# Patient Record
Sex: FEMALE | Race: WHITE | NOT HISPANIC OR LATINO | Employment: OTHER | URBAN - METROPOLITAN AREA
[De-identification: names, ages, dates, MRNs, and addresses within clinical notes are randomized per-mention and may not be internally consistent; named-entity substitution may affect disease eponyms.]

---

## 2023-03-07 ENCOUNTER — NURSING HOME VISIT (OUTPATIENT)
Dept: POST ACUTE CARE | Facility: EXTERNAL LOCATION | Age: 78
End: 2023-03-07
Payer: MEDICARE

## 2023-03-07 DIAGNOSIS — F03.B0 MODERATE DEMENTIA, UNSPECIFIED DEMENTIA TYPE, UNSPECIFIED WHETHER BEHAVIORAL, PSYCHOTIC, OR MOOD DISTURBANCE OR ANXIETY (MULTI): ICD-10-CM

## 2023-03-07 DIAGNOSIS — E78.5 HYPERLIPIDEMIA, UNSPECIFIED HYPERLIPIDEMIA TYPE: ICD-10-CM

## 2023-03-07 DIAGNOSIS — M19.90 OSTEOARTHRITIS, UNSPECIFIED OSTEOARTHRITIS TYPE, UNSPECIFIED SITE: ICD-10-CM

## 2023-03-07 PROCEDURE — 99348 HOME/RES VST EST LOW MDM 30: CPT | Performed by: INTERNAL MEDICINE

## 2023-03-07 NOTE — LETTER
Subjective  Chief complaint: Gabby Espino is a 77 y.o. female who is a assisted living/ home patient  Presents for general medical care and follow-up.  HPI:  Patient presents for general medical care and f/u.  Patient seen and examined at bedside.  No issues per nursing.  Patient has no acute complaints. Denies chest pain and headache.  Patient has a diagnosis of hyperlipidemia.   denies any muscle aches.  Patient is a diagnosis of osteoarthritis.  Denies any pain or discomfort.  Patient has dementia and requires assist with ADLs.  No acute distress.        Review of Systems  All systems reviewed and negative except for what was mentioned in the HPI    Vital signs: 104/80, 98.2, 72, 18    Objective  Physical Exam  Constitutional:       General: She is not in acute distress.  Eyes:      Extraocular Movements: Extraocular movements intact.   Cardiovascular:      Rate and Rhythm: Regular rhythm.   Pulmonary:      Effort: Pulmonary effort is normal.      Breath sounds: Normal breath sounds.   Abdominal:      General: Bowel sounds are normal.      Palpations: Abdomen is soft.   Musculoskeletal:      Cervical back: Neck supple.      Right lower leg: No edema.      Left lower leg: No edema.   Neurological:      Mental Status: She is alert.   Psychiatric:         Mood and Affect: Mood normal.         Behavior: Behavior is cooperative.         Assessment/Plan  Problem List Items Addressed This Visit       Dementia (CMS/HCC)     Mentation at baseline.  Continue Aricept.  Monitor for changes in behavior.         HLD (hyperlipidemia)     Continue atorvastatin.         OA (osteoarthritis)     Pain is controlled.  Monitor for changes.          Medications, treatments, and labs reviewed  Continue medications and treatments as listed in [facility]    Scribe Attestation  By signing my name below, ralf MENARD Scribe   attest that this documentation has been prepared under the direction and in the presence of Krissy Tillman  MD.    Provider Attestation - Scribe documentation  All medical record entries made by the Scribe were at my direction and personally dictated by me. I have reviewed the chart and agree that the record accurately reflects my personal performance of the history, physical exam, discussion and plan.

## 2023-03-08 PROBLEM — C50.919 BREAST CA (MULTI): Status: ACTIVE | Noted: 2023-03-08

## 2023-03-08 PROBLEM — E78.5 HLD (HYPERLIPIDEMIA): Status: ACTIVE | Noted: 2023-03-08

## 2023-03-08 PROBLEM — M19.90 OA (OSTEOARTHRITIS): Status: ACTIVE | Noted: 2023-03-08

## 2023-03-08 PROBLEM — F03.90 DEMENTIA (MULTI): Status: ACTIVE | Noted: 2023-03-08

## 2023-04-04 ENCOUNTER — NURSING HOME VISIT (OUTPATIENT)
Dept: POST ACUTE CARE | Facility: EXTERNAL LOCATION | Age: 78
End: 2023-04-04
Payer: MEDICARE

## 2023-04-04 DIAGNOSIS — F03.B0 MODERATE DEMENTIA, UNSPECIFIED DEMENTIA TYPE, UNSPECIFIED WHETHER BEHAVIORAL, PSYCHOTIC, OR MOOD DISTURBANCE OR ANXIETY (MULTI): ICD-10-CM

## 2023-04-04 DIAGNOSIS — E78.5 HYPERLIPIDEMIA, UNSPECIFIED HYPERLIPIDEMIA TYPE: ICD-10-CM

## 2023-04-04 DIAGNOSIS — M19.90 OSTEOARTHRITIS, UNSPECIFIED OSTEOARTHRITIS TYPE, UNSPECIFIED SITE: ICD-10-CM

## 2023-04-04 PROCEDURE — 99348 HOME/RES VST EST LOW MDM 30: CPT | Performed by: INTERNAL MEDICINE

## 2023-04-04 NOTE — LETTER
Patient: Gabby Espino  : 1945    Encounter Date: 2023    PROGRESS NOTE    Subjective  Chief complaint: Gabby Espino is a 77 y.o. female who is an assisted living patient being seen and evaluated for monthly general medical care and follow-up.    HPI:  Patient presents for general medical care and f/u.  Patient seen and examined at bedside.  No issues per nursing.  Patient has no acute complaints. Denies chest pain and headache.  Patient has a diagnosis of hyperlipidemia.   denies any muscle aches.  Patient is a diagnosis of osteoarthritis.  Denies any pain or discomfort.  Patient has dementia and requires assist with ADLs.  No acute distress.      Objective  Vital signs:  120/76, 76, 18, 96%    Physical Exam  Constitutional:       General: She is not in acute distress.  Eyes:      Extraocular Movements: Extraocular movements intact.   Cardiovascular:      Rate and Rhythm: Regular rhythm.   Pulmonary:      Effort: Pulmonary effort is normal.      Breath sounds: Normal breath sounds.   Abdominal:      General: Bowel sounds are normal.      Palpations: Abdomen is soft.   Musculoskeletal:      Cervical back: Neck supple.      Right lower leg: No edema.      Left lower leg: No edema.   Neurological:      Mental Status: She is alert.   Psychiatric:         Mood and Affect: Mood normal.         Behavior: Behavior is cooperative.         Assessment/Plan  Problem List Items Addressed This Visit          Nervous    Dementia (CMS/HCC)     Mentation at baseline.    Continue Aricept.    Monitor for changes in behavior.            Musculoskeletal    OA (osteoarthritis)     Pain is controlled.    Monitor for changes.            Other    HLD (hyperlipidemia)     Continue atorvastatin.          Medications, treatments, and labs reviewed  Continue medications and treatments as listed in [facility]    Scribe Attestation  By signing my name below, I, Bijal Arellano, Scribe   attest that this documentation has been prepared  under the direction and in the presence of Krissy Tillman MD.    Provider Attestation - Scribe documentation  All medical record entries made by the Scribe were at my direction and personally dictated by me. I have reviewed the chart and agree that the record accurately reflects my personal performance of the history, physical exam, discussion and plan.      Electronically Signed By: Krissy Tillman MD   4/6/23  4:54 PM

## 2023-04-06 NOTE — PROGRESS NOTES
PROGRESS NOTE    Subjective   Chief complaint: Gabby Espino is a 77 y.o. female who is an assisted living patient being seen and evaluated for monthly general medical care and follow-up.    HPI:  Patient presents for general medical care and f/u.  Patient seen and examined at bedside.  No issues per nursing.  Patient has no acute complaints. Denies chest pain and headache.  Patient has a diagnosis of hyperlipidemia.   denies any muscle aches.  Patient is a diagnosis of osteoarthritis.  Denies any pain or discomfort.  Patient has dementia and requires assist with ADLs.  No acute distress.      Objective   Vital signs:  120/76, 76, 18, 96%    Physical Exam  Constitutional:       General: She is not in acute distress.  Eyes:      Extraocular Movements: Extraocular movements intact.   Cardiovascular:      Rate and Rhythm: Regular rhythm.   Pulmonary:      Effort: Pulmonary effort is normal.      Breath sounds: Normal breath sounds.   Abdominal:      General: Bowel sounds are normal.      Palpations: Abdomen is soft.   Musculoskeletal:      Cervical back: Neck supple.      Right lower leg: No edema.      Left lower leg: No edema.   Neurological:      Mental Status: She is alert.   Psychiatric:         Mood and Affect: Mood normal.         Behavior: Behavior is cooperative.         Assessment/Plan   Problem List Items Addressed This Visit          Nervous    Dementia (CMS/HCC)     Mentation at baseline.    Continue Aricept.    Monitor for changes in behavior.            Musculoskeletal    OA (osteoarthritis)     Pain is controlled.    Monitor for changes.            Other    HLD (hyperlipidemia)     Continue atorvastatin.          Medications, treatments, and labs reviewed  Continue medications and treatments as listed in [facility]    Scribe Attestation  By signing my name below, Bijal MENARD, Scribe   attest that this documentation has been prepared under the direction and in the presence of Krissy Tillman  MD.    Provider Attestation - Scribe documentation  All medical record entries made by the Scribe were at my direction and personally dictated by me. I have reviewed the chart and agree that the record accurately reflects my personal performance of the history, physical exam, discussion and plan.

## 2023-05-02 ENCOUNTER — NURSING HOME VISIT (OUTPATIENT)
Dept: POST ACUTE CARE | Facility: EXTERNAL LOCATION | Age: 78
End: 2023-05-02
Payer: MEDICARE

## 2023-05-02 DIAGNOSIS — M19.90 OSTEOARTHRITIS, UNSPECIFIED OSTEOARTHRITIS TYPE, UNSPECIFIED SITE: ICD-10-CM

## 2023-05-02 DIAGNOSIS — E78.5 HYPERLIPIDEMIA, UNSPECIFIED HYPERLIPIDEMIA TYPE: ICD-10-CM

## 2023-05-02 DIAGNOSIS — F03.B0 MODERATE DEMENTIA, UNSPECIFIED DEMENTIA TYPE, UNSPECIFIED WHETHER BEHAVIORAL, PSYCHOTIC, OR MOOD DISTURBANCE OR ANXIETY (MULTI): ICD-10-CM

## 2023-05-02 PROCEDURE — 99348 HOME/RES VST EST LOW MDM 30: CPT | Performed by: INTERNAL MEDICINE

## 2023-05-02 NOTE — LETTER
Patient: Gabby Espino  : 1945    Encounter Date: 2023    PROGRESS NOTE    Subjective  Chief complaint: Gabby Espino is a 77 y.o. female who is an assisted living patient being seen and evaluated for monthly general medical care and follow-up.    HPI:  Patient presents for general medical care and f/u.  Patient seen and examined at bedside.  No issues per nursing.  Patient has no acute complaints. Denies chest pain and headache.  Patient has a diagnosis of hyperlipidemia.   denies any muscle aches.  Patient is a diagnosis of osteoarthritis.  Denies any pain or discomfort.  Patient has dementia and requires assist with ADLs.  No acute distress.      Objective  Vital signs:  123/76, 96%    Physical Exam  Constitutional:       General: She is not in acute distress.  Eyes:      Extraocular Movements: Extraocular movements intact.   Cardiovascular:      Rate and Rhythm: Regular rhythm.   Pulmonary:      Effort: Pulmonary effort is normal.      Breath sounds: Normal breath sounds.   Abdominal:      General: Bowel sounds are normal.      Palpations: Abdomen is soft.   Musculoskeletal:      Cervical back: Neck supple.      Right lower leg: No edema.      Left lower leg: No edema.   Neurological:      Mental Status: She is alert.   Psychiatric:         Mood and Affect: Mood normal.         Behavior: Behavior is cooperative.         Assessment/Plan  Problem List Items Addressed This Visit          Nervous    Dementia (CMS/HCC)     Mentation at baseline.    Continue Aricept.    Monitor for changes in behavior.            Musculoskeletal    OA (osteoarthritis)     Pain is controlled.    Monitor for changes.            Other    HLD (hyperlipidemia)     Continue atorvastatin.        Medications, treatments, and labs reviewed  Continue medications and treatments as listed in [facility]    Scribe Attestation  By signing my name below, IBijal, Carolinaibdarlyn   attest that this documentation has been prepared under the  direction and in the presence of Krissy Tillman MD.    Provider Attestation - Scribe documentation  All medical record entries made by the Scribe were at my direction and personally dictated by me. I have reviewed the chart and agree that the record accurately reflects my personal performance of the history, physical exam, discussion and plan.  1. Moderate dementia, unspecified dementia type, unspecified whether behavioral, psychotic, or mood disturbance or anxiety (CMS/[facility])        2. Hyperlipidemia, unspecified hyperlipidemia type        3. Osteoarthritis, unspecified osteoarthritis type, unspecified site                Electronically Signed By: Krissy Tillman MD   5/3/23  4:53 PM

## 2023-05-03 NOTE — PROGRESS NOTES
PROGRESS NOTE    Subjective   Chief complaint: Gabby Espino is a 77 y.o. female who is an assisted living patient being seen and evaluated for monthly general medical care and follow-up.    HPI:  Patient presents for general medical care and f/u.  Patient seen and examined at bedside.  No issues per nursing.  Patient has no acute complaints. Denies chest pain and headache.  Patient has a diagnosis of hyperlipidemia.   denies any muscle aches.  Patient is a diagnosis of osteoarthritis.  Denies any pain or discomfort.  Patient has dementia and requires assist with ADLs.  No acute distress.      Objective   Vital signs:  123/76, 96%    Physical Exam  Constitutional:       General: She is not in acute distress.  Eyes:      Extraocular Movements: Extraocular movements intact.   Cardiovascular:      Rate and Rhythm: Regular rhythm.   Pulmonary:      Effort: Pulmonary effort is normal.      Breath sounds: Normal breath sounds.   Abdominal:      General: Bowel sounds are normal.      Palpations: Abdomen is soft.   Musculoskeletal:      Cervical back: Neck supple.      Right lower leg: No edema.      Left lower leg: No edema.   Neurological:      Mental Status: She is alert.   Psychiatric:         Mood and Affect: Mood normal.         Behavior: Behavior is cooperative.         Assessment/Plan   Problem List Items Addressed This Visit          Nervous    Dementia (CMS/HCC)     Mentation at baseline.    Continue Aricept.    Monitor for changes in behavior.            Musculoskeletal    OA (osteoarthritis)     Pain is controlled.    Monitor for changes.            Other    HLD (hyperlipidemia)     Continue atorvastatin.        Medications, treatments, and labs reviewed  Continue medications and treatments as listed in [facility]    Scribe Attestation  By signing my name below, Bijal MENARD, Scribe   attest that this documentation has been prepared under the direction and in the presence of Krissy Tillman MD.    Provider  Attestation - Scribe documentation  All medical record entries made by the Scribe were at my direction and personally dictated by me. I have reviewed the chart and agree that the record accurately reflects my personal performance of the history, physical exam, discussion and plan.  1. Moderate dementia, unspecified dementia type, unspecified whether behavioral, psychotic, or mood disturbance or anxiety (CMS/HCC)        2. Hyperlipidemia, unspecified hyperlipidemia type        3. Osteoarthritis, unspecified osteoarthritis type, unspecified site

## 2023-06-06 ENCOUNTER — NURSING HOME VISIT (OUTPATIENT)
Dept: POST ACUTE CARE | Facility: EXTERNAL LOCATION | Age: 78
End: 2023-06-06
Payer: MEDICARE

## 2023-06-06 DIAGNOSIS — E78.5 HYPERLIPIDEMIA, UNSPECIFIED HYPERLIPIDEMIA TYPE: ICD-10-CM

## 2023-06-06 DIAGNOSIS — M19.90 OSTEOARTHRITIS, UNSPECIFIED OSTEOARTHRITIS TYPE, UNSPECIFIED SITE: ICD-10-CM

## 2023-06-06 DIAGNOSIS — F03.B0 MODERATE DEMENTIA, UNSPECIFIED DEMENTIA TYPE, UNSPECIFIED WHETHER BEHAVIORAL, PSYCHOTIC, OR MOOD DISTURBANCE OR ANXIETY (MULTI): ICD-10-CM

## 2023-06-06 PROCEDURE — 99348 HOME/RES VST EST LOW MDM 30: CPT | Performed by: INTERNAL MEDICINE

## 2023-06-06 NOTE — LETTER
Patient: Gabby Espino  : 1945    Encounter Date: 2023    PROGRESS NOTE    Subjective  Chief complaint: Gabby Espnio is a 77 y.o. female who is an assisted living patient being seen and evaluated for monthly general medical care and follow-up.    HPI:  Patient presents for general medical care and f/u.  Patient seen and examined at bedside.  No issues per nursing.  Patient has no acute complaints. Denies chest pain and headache.  Patient has a diagnosis of hyperlipidemia.   denies any muscle aches.  Patient is a diagnosis of osteoarthritis.  Denies any pain or discomfort.  Patient has dementia and requires assist with ADLs.  No acute distress.      Objective  Vital signs:  123/76, 96%    Physical Exam  Constitutional:       General: She is not in acute distress.  Eyes:      Extraocular Movements: Extraocular movements intact.   Cardiovascular:      Rate and Rhythm: Regular rhythm.   Pulmonary:      Effort: Pulmonary effort is normal.      Breath sounds: Normal breath sounds.   Abdominal:      General: Bowel sounds are normal.      Palpations: Abdomen is soft.   Musculoskeletal:      Cervical back: Neck supple.      Right lower leg: No edema.      Left lower leg: No edema.   Neurological:      Mental Status: She is alert.   Psychiatric:         Mood and Affect: Mood normal.         Behavior: Behavior is cooperative.         Assessment/Plan  Problem List Items Addressed This Visit          Nervous    Dementia (CMS/HCC)     Mentation at baseline.    Continue Aricept.    Monitor for changes in behavior.            Musculoskeletal    OA (osteoarthritis)     Pain is controlled.    Monitor for changes.            Other    HLD (hyperlipidemia)     Continue atorvastatin.  Monitor labs routinely        Medications, treatments, and labs reviewed  Continue medications and treatments as listed in [facility]    Scribe Attestation  By signing my name below, I, Bijal Arellano, Scribe   attest that this documentation has  been prepared under the direction and in the presence of Krissy Tillman MD.    Provider Attestation - Scribe documentation  All medical record entries made by the Scribe were at my direction and personally dictated by me. I have reviewed the chart and agree that the record accurately reflects my personal performance of the history, physical exam, discussion and plan.  1. Osteoarthritis, unspecified osteoarthritis type, unspecified site        2. Hyperlipidemia, unspecified hyperlipidemia type        3. Moderate dementia, unspecified dementia type, unspecified whether behavioral, psychotic, or mood disturbance or anxiety (CMS/[facility])                Electronically Signed By: Krissy Tillman MD   6/7/23  6:01 PM

## 2023-06-07 NOTE — PROGRESS NOTES
PROGRESS NOTE    Subjective   Chief complaint: Gabby Espino is a 77 y.o. female who is an assisted living patient being seen and evaluated for monthly general medical care and follow-up.    HPI:  Patient presents for general medical care and f/u.  Patient seen and examined at bedside.  No issues per nursing.  Patient has no acute complaints. Denies chest pain and headache.  Patient has a diagnosis of hyperlipidemia.   denies any muscle aches.  Patient is a diagnosis of osteoarthritis.  Denies any pain or discomfort.  Patient has dementia and requires assist with ADLs.  No acute distress.      Objective   Vital signs:  123/76, 96%    Physical Exam  Constitutional:       General: She is not in acute distress.  Eyes:      Extraocular Movements: Extraocular movements intact.   Cardiovascular:      Rate and Rhythm: Regular rhythm.   Pulmonary:      Effort: Pulmonary effort is normal.      Breath sounds: Normal breath sounds.   Abdominal:      General: Bowel sounds are normal.      Palpations: Abdomen is soft.   Musculoskeletal:      Cervical back: Neck supple.      Right lower leg: No edema.      Left lower leg: No edema.   Neurological:      Mental Status: She is alert.   Psychiatric:         Mood and Affect: Mood normal.         Behavior: Behavior is cooperative.         Assessment/Plan   Problem List Items Addressed This Visit          Nervous    Dementia (CMS/HCC)     Mentation at baseline.    Continue Aricept.    Monitor for changes in behavior.            Musculoskeletal    OA (osteoarthritis)     Pain is controlled.    Monitor for changes.            Other    HLD (hyperlipidemia)     Continue atorvastatin.  Monitor labs routinely        Medications, treatments, and labs reviewed  Continue medications and treatments as listed in [facility]    Scribe Attestation  By signing my name below, Bijal MENARD, Scribe   attest that this documentation has been prepared under the direction and in the presence of Krissy ALVARADO  MD Primo.    Provider Attestation - Scribe documentation  All medical record entries made by the Scribe were at my direction and personally dictated by me. I have reviewed the chart and agree that the record accurately reflects my personal performance of the history, physical exam, discussion and plan.  1. Osteoarthritis, unspecified osteoarthritis type, unspecified site        2. Hyperlipidemia, unspecified hyperlipidemia type        3. Moderate dementia, unspecified dementia type, unspecified whether behavioral, psychotic, or mood disturbance or anxiety (CMS/[facility])

## 2023-07-11 ENCOUNTER — NURSING HOME VISIT (OUTPATIENT)
Dept: POST ACUTE CARE | Facility: EXTERNAL LOCATION | Age: 78
End: 2023-07-11
Payer: MEDICARE

## 2023-07-11 DIAGNOSIS — E78.5 HYPERLIPIDEMIA, UNSPECIFIED HYPERLIPIDEMIA TYPE: ICD-10-CM

## 2023-07-11 DIAGNOSIS — F03.B0 MODERATE DEMENTIA, UNSPECIFIED DEMENTIA TYPE, UNSPECIFIED WHETHER BEHAVIORAL, PSYCHOTIC, OR MOOD DISTURBANCE OR ANXIETY (MULTI): ICD-10-CM

## 2023-07-11 DIAGNOSIS — M19.90 OSTEOARTHRITIS, UNSPECIFIED OSTEOARTHRITIS TYPE, UNSPECIFIED SITE: ICD-10-CM

## 2023-07-11 PROCEDURE — 99348 HOME/RES VST EST LOW MDM 30: CPT | Performed by: INTERNAL MEDICINE

## 2023-07-11 NOTE — LETTER
Patient: Gabby Espino  : 1945    Encounter Date: 2023    PROGRESS NOTE    Subjective  Chief complaint: Gabby Espino is a 78 y.o. female who is an assisted living patient being seen and evaluated for monthly general medical care and follow-up.    HPI:  Patient presents for general medical care and f/u.  Patient seen and examined at bedside.  No issues per nursing.  Patient has no acute complaints.  Patient has a diagnosis of hyperlipidemia.   denies any muscle aches.  Patient is a diagnosis of osteoarthritis.  Denies any pain or discomfort.  Patient with dementia resides in AL facility and requires little assist with ADLs.  No acute distress.      Objective  Vital signs:  126/76, 96%    Physical Exam  Constitutional:       General: She is not in acute distress.  Eyes:      Extraocular Movements: Extraocular movements intact.   Cardiovascular:      Rate and Rhythm: Regular rhythm.   Pulmonary:      Effort: Pulmonary effort is normal.      Breath sounds: Normal breath sounds.   Abdominal:      General: Bowel sounds are normal.      Palpations: Abdomen is soft.   Musculoskeletal:      Cervical back: Neck supple.      Right lower leg: No edema.      Left lower leg: No edema.   Neurological:      Mental Status: She is alert.   Psychiatric:         Mood and Affect: Mood normal.         Behavior: Behavior is cooperative.         Assessment/Plan  Problem List Items Addressed This Visit          Cardiac and Vasculature    HLD (hyperlipidemia)     Continue atorvastatin.  Monitor labs routinely            Musculoskeletal and Injuries    OA (osteoarthritis)     Pain is controlled.    Monitor for changes.            Neuro    Dementia (CMS/[facility])     Mentation at baseline.    Continue Aricept.    Monitor for changes in behavior.        Medications, treatments, and labs reviewed  Continue medications and treatments as listed in [facility]    Scribe Attestation  By signing my name below, I, Bijal Arellano, Scribe   attest  that this documentation has been prepared under the direction and in the presence of Krissy Tillman MD.    Provider Attestation - Scribe documentation  All medical record entries made by the Scribe were at my direction and personally dictated by me. I have reviewed the chart and agree that the record accurately reflects my personal performance of the history, physical exam, discussion and plan.  1. Hyperlipidemia, unspecified hyperlipidemia type        2. Osteoarthritis, unspecified osteoarthritis type, unspecified site        3. Moderate dementia, unspecified dementia type, unspecified whether behavioral, psychotic, or mood disturbance or anxiety (CMS/[facility])                Electronically Signed By: Krissy Tillman MD   7/13/23 10:36 AM

## 2023-07-13 NOTE — PROGRESS NOTES
PROGRESS NOTE    Subjective   Chief complaint: Gabby Espino is a 78 y.o. female who is an assisted living patient being seen and evaluated for monthly general medical care and follow-up.    HPI:  Patient presents for general medical care and f/u.  Patient seen and examined at bedside.  No issues per nursing.  Patient has no acute complaints.  Patient has a diagnosis of hyperlipidemia.   denies any muscle aches.  Patient is a diagnosis of osteoarthritis.  Denies any pain or discomfort.  Patient with dementia resides in AL facility and requires little assist with ADLs.  No acute distress.      Objective   Vital signs:  126/76, 96%    Physical Exam  Constitutional:       General: She is not in acute distress.  Eyes:      Extraocular Movements: Extraocular movements intact.   Cardiovascular:      Rate and Rhythm: Regular rhythm.   Pulmonary:      Effort: Pulmonary effort is normal.      Breath sounds: Normal breath sounds.   Abdominal:      General: Bowel sounds are normal.      Palpations: Abdomen is soft.   Musculoskeletal:      Cervical back: Neck supple.      Right lower leg: No edema.      Left lower leg: No edema.   Neurological:      Mental Status: She is alert.   Psychiatric:         Mood and Affect: Mood normal.         Behavior: Behavior is cooperative.         Assessment/Plan   Problem List Items Addressed This Visit          Cardiac and Vasculature    HLD (hyperlipidemia)     Continue atorvastatin.  Monitor labs routinely            Musculoskeletal and Injuries    OA (osteoarthritis)     Pain is controlled.    Monitor for changes.            Neuro    Dementia (CMS/[facility])     Mentation at baseline.    Continue Aricept.    Monitor for changes in behavior.        Medications, treatments, and labs reviewed  Continue medications and treatments as listed in [facility]    Scribe Attestation  By signing my name below, IBijal, Scribe   attest that this documentation has been prepared under the direction and in  the presence of Krissy Tillman MD.    Provider Attestation - Scribe documentation  All medical record entries made by the Scribe were at my direction and personally dictated by me. I have reviewed the chart and agree that the record accurately reflects my personal performance of the history, physical exam, discussion and plan.  1. Hyperlipidemia, unspecified hyperlipidemia type        2. Osteoarthritis, unspecified osteoarthritis type, unspecified site        3. Moderate dementia, unspecified dementia type, unspecified whether behavioral, psychotic, or mood disturbance or anxiety (CMS/[facility])

## 2023-08-01 ENCOUNTER — NURSING HOME VISIT (OUTPATIENT)
Dept: POST ACUTE CARE | Facility: EXTERNAL LOCATION | Age: 78
End: 2023-08-01
Payer: MEDICARE

## 2023-08-01 DIAGNOSIS — F03.B0 MODERATE DEMENTIA, UNSPECIFIED DEMENTIA TYPE, UNSPECIFIED WHETHER BEHAVIORAL, PSYCHOTIC, OR MOOD DISTURBANCE OR ANXIETY (MULTI): Primary | ICD-10-CM

## 2023-08-01 DIAGNOSIS — E78.5 HYPERLIPIDEMIA, UNSPECIFIED HYPERLIPIDEMIA TYPE: ICD-10-CM

## 2023-08-01 DIAGNOSIS — M19.90 OSTEOARTHRITIS, UNSPECIFIED OSTEOARTHRITIS TYPE, UNSPECIFIED SITE: ICD-10-CM

## 2023-08-01 PROCEDURE — 99348 HOME/RES VST EST LOW MDM 30: CPT | Performed by: INTERNAL MEDICINE

## 2023-08-01 NOTE — PROGRESS NOTES
PROGRESS NOTE    Subjective   Chief complaint: Gabby Espino is a 78 y.o. female who is an assisted living patient being seen and evaluated for monthly general medical care and follow-up.    HPI:  Patient presents for general medical care and f/u.  Patient seen and examined at bedside.  No issues per nursing.  Patient has no acute complaints. Patient has a diagnosis of hyperlipidemia.   denies any muscle aches.  Patient is a diagnosis of osteoarthritis.  Denies any pain or discomfort.  Patient has dementia and requires assist with ADLs.  No acute distress.      Objective   Vital signs:  123/76, 96%    Physical Exam  Constitutional:       General: She is not in acute distress.  Eyes:      Extraocular Movements: Extraocular movements intact.   Cardiovascular:      Rate and Rhythm: Regular rhythm.   Pulmonary:      Effort: Pulmonary effort is normal.      Breath sounds: Normal breath sounds.   Abdominal:      General: Bowel sounds are normal.      Palpations: Abdomen is soft.   Musculoskeletal:      Cervical back: Neck supple.      Right lower leg: No edema.      Left lower leg: No edema.   Neurological:      Mental Status: She is alert.   Psychiatric:         Mood and Affect: Mood normal.         Behavior: Behavior is cooperative.         Assessment/Plan   Problem List Items Addressed This Visit       Dementia (CMS/HCC) - Primary     Mentation at baseline.    Continue Aricept.    Monitor for changes in behavior.         HLD (hyperlipidemia)     Continue atorvastatin.  Monitor labs routinely         OA (osteoarthritis)     Pain is controlled.    Monitor for changes.        Medications, treatments, and labs reviewed  Continue medications and treatments as listed in PCC    Scribe Attestation  By signing my name below, IBijal, Scribdarlyn   attest that this documentation has been prepared under the direction and in the presence of Krissy Tillman MD.    Provider Attestation - Scribe documentation  All medical  record entries made by the Scribe were at my direction and personally dictated by me. I have reviewed the chart and agree that the record accurately reflects my personal performance of the history, physical exam, discussion and plan.  1. Moderate dementia, unspecified dementia type, unspecified whether behavioral, psychotic, or mood disturbance or anxiety (CMS/[facility])        2. Hyperlipidemia, unspecified hyperlipidemia type        3. Osteoarthritis, unspecified osteoarthritis type, unspecified site

## 2023-08-01 NOTE — LETTER
Patient: Gabby Espino  : 1945    Encounter Date: 2023    PROGRESS NOTE    Subjective  Chief complaint: Gabby Espino is a 78 y.o. female who is an assisted living patient being seen and evaluated for monthly general medical care and follow-up.    HPI:  Patient presents for general medical care and f/u.  Patient seen and examined at bedside.  No issues per nursing.  Patient has no acute complaints. Patient has a diagnosis of hyperlipidemia.   denies any muscle aches.  Patient is a diagnosis of osteoarthritis.  Denies any pain or discomfort.  Patient has dementia and requires assist with ADLs.  No acute distress.      Objective  Vital signs:  123/76, 96%    Physical Exam  Constitutional:       General: She is not in acute distress.  Eyes:      Extraocular Movements: Extraocular movements intact.   Cardiovascular:      Rate and Rhythm: Regular rhythm.   Pulmonary:      Effort: Pulmonary effort is normal.      Breath sounds: Normal breath sounds.   Abdominal:      General: Bowel sounds are normal.      Palpations: Abdomen is soft.   Musculoskeletal:      Cervical back: Neck supple.      Right lower leg: No edema.      Left lower leg: No edema.   Neurological:      Mental Status: She is alert.   Psychiatric:         Mood and Affect: Mood normal.         Behavior: Behavior is cooperative.         Assessment/Plan  Problem List Items Addressed This Visit       Dementia (CMS/HCC) - Primary     Mentation at baseline.    Continue Aricept.    Monitor for changes in behavior.         HLD (hyperlipidemia)     Continue atorvastatin.  Monitor labs routinely         OA (osteoarthritis)     Pain is controlled.    Monitor for changes.        Medications, treatments, and labs reviewed  Continue medications and treatments as listed in PCC    Scribe Attestation  By signing my name below, IBijal, Scribe   attest that this documentation has been prepared under the direction and in the presence of Krissy Tillman  MD.    Provider Attestation - Scribe documentation  All medical record entries made by the Scribe were at my direction and personally dictated by me. I have reviewed the chart and agree that the record accurately reflects my personal performance of the history, physical exam, discussion and plan.  1. Moderate dementia, unspecified dementia type, unspecified whether behavioral, psychotic, or mood disturbance or anxiety (CMS/HCC)        2. Hyperlipidemia, unspecified hyperlipidemia type        3. Osteoarthritis, unspecified osteoarthritis type, unspecified site                Electronically Signed By: Krissy Tillman MD   8/1/23  4:12 PM

## 2023-09-05 ENCOUNTER — NURSING HOME VISIT (OUTPATIENT)
Dept: POST ACUTE CARE | Facility: EXTERNAL LOCATION | Age: 78
End: 2023-09-05
Payer: MEDICARE

## 2023-09-05 DIAGNOSIS — M19.90 OSTEOARTHRITIS, UNSPECIFIED OSTEOARTHRITIS TYPE, UNSPECIFIED SITE: Primary | ICD-10-CM

## 2023-09-05 DIAGNOSIS — E78.5 HYPERLIPIDEMIA, UNSPECIFIED HYPERLIPIDEMIA TYPE: ICD-10-CM

## 2023-09-05 DIAGNOSIS — F03.B0 MODERATE DEMENTIA, UNSPECIFIED DEMENTIA TYPE, UNSPECIFIED WHETHER BEHAVIORAL, PSYCHOTIC, OR MOOD DISTURBANCE OR ANXIETY (MULTI): ICD-10-CM

## 2023-09-05 PROCEDURE — 99348 HOME/RES VST EST LOW MDM 30: CPT | Performed by: INTERNAL MEDICINE

## 2023-09-05 NOTE — PROGRESS NOTES
PROGRESS NOTE    Subjective   Chief complaint: Gabby Espino is a 78 y.o. female who is an assisted living patient being seen and evaluated for monthly general medical care and follow-up.    HPI:  Patient presents for general medical care and f/u.  Patient seen and examined at bedside.  No issues per nursing.  Patient has no acute complaints. Patient has a diagnosis of hyperlipidemia.   denies any muscle aches.  Patient is a diagnosis of osteoarthritis.  Denies any pain or discomfort.  Patient has dementia and requires assist with ADLs.  No acute distress.      Objective   Vital signs:  126/74, 98%    Physical Exam  Constitutional:       General: She is not in acute distress.  Eyes:      Extraocular Movements: Extraocular movements intact.   Cardiovascular:      Rate and Rhythm: Regular rhythm.   Pulmonary:      Effort: Pulmonary effort is normal.      Breath sounds: Normal breath sounds.   Abdominal:      General: Bowel sounds are normal.      Palpations: Abdomen is soft.   Musculoskeletal:      Cervical back: Neck supple.      Right lower leg: No edema.      Left lower leg: No edema.   Neurological:      Mental Status: She is alert.   Psychiatric:         Mood and Affect: Mood normal.         Behavior: Behavior is cooperative.         Assessment/Plan   Problem List Items Addressed This Visit       Dementia (CMS/[facility])     Mentation at baseline.    Continue Aricept.    Monitor for changes in behavior.         HLD (hyperlipidemia)     Continue atorvastatin.  Monitor labs routinely         OA (osteoarthritis) - Primary     Pain is controlled.    Monitor for changes.        Medications, treatments, and labs reviewed  Continue medications and treatments as listed in PCC    Scribe Attestation  By signing my name below, IBijal, Scribdarlyn   attest that this documentation has been prepared under the direction and in the presence of Krissy Tillman MD.    Provider Attestation - Scribe documentation  All medical  record entries made by the Scribe were at my direction and personally dictated by me. I have reviewed the chart and agree that the record accurately reflects my personal performance of the history, physical exam, discussion and plan.  1. Osteoarthritis, unspecified osteoarthritis type, unspecified site        2. Hyperlipidemia, unspecified hyperlipidemia type        3. Moderate dementia, unspecified dementia type, unspecified whether behavioral, psychotic, or mood disturbance or anxiety (CMS/[facility])

## 2023-09-05 NOTE — LETTER
Patient: Gabby Espino  : 1945    Encounter Date: 2023    PROGRESS NOTE    Subjective  Chief complaint: Gabby Espino is a 78 y.o. female who is an assisted living patient being seen and evaluated for monthly general medical care and follow-up.    HPI:  Patient presents for general medical care and f/u.  Patient seen and examined at bedside.  No issues per nursing.  Patient has no acute complaints. Patient has a diagnosis of hyperlipidemia.   denies any muscle aches.  Patient is a diagnosis of osteoarthritis.  Denies any pain or discomfort.  Patient has dementia and requires assist with ADLs.  No acute distress.      Objective  Vital signs:  126/74, 98%    Physical Exam  Constitutional:       General: She is not in acute distress.  Eyes:      Extraocular Movements: Extraocular movements intact.   Cardiovascular:      Rate and Rhythm: Regular rhythm.   Pulmonary:      Effort: Pulmonary effort is normal.      Breath sounds: Normal breath sounds.   Abdominal:      General: Bowel sounds are normal.      Palpations: Abdomen is soft.   Musculoskeletal:      Cervical back: Neck supple.      Right lower leg: No edema.      Left lower leg: No edema.   Neurological:      Mental Status: She is alert.   Psychiatric:         Mood and Affect: Mood normal.         Behavior: Behavior is cooperative.         Assessment/Plan  Problem List Items Addressed This Visit       Dementia (CMS/HCC)     Mentation at baseline.    Continue Aricept.    Monitor for changes in behavior.         HLD (hyperlipidemia)     Continue atorvastatin.  Monitor labs routinely         OA (osteoarthritis) - Primary     Pain is controlled.    Monitor for changes.        Medications, treatments, and labs reviewed  Continue medications and treatments as listed in PCC    Scribe Attestation  By signing my name below, IBijal, Scribe   attest that this documentation has been prepared under the direction and in the presence of Krissy Tillman  MD.    Provider Attestation - Scribe documentation  All medical record entries made by the Scribe were at my direction and personally dictated by me. I have reviewed the chart and agree that the record accurately reflects my personal performance of the history, physical exam, discussion and plan.  1. Osteoarthritis, unspecified osteoarthritis type, unspecified site        2. Hyperlipidemia, unspecified hyperlipidemia type        3. Moderate dementia, unspecified dementia type, unspecified whether behavioral, psychotic, or mood disturbance or anxiety (CMS/[facility])                Electronically Signed By: Krissy Tillman MD   9/5/23  8:28 PM

## 2023-10-03 ENCOUNTER — NURSING HOME VISIT (OUTPATIENT)
Dept: POST ACUTE CARE | Facility: EXTERNAL LOCATION | Age: 78
End: 2023-10-03
Payer: MEDICARE

## 2023-10-03 DIAGNOSIS — F03.B0 MODERATE DEMENTIA, UNSPECIFIED DEMENTIA TYPE, UNSPECIFIED WHETHER BEHAVIORAL, PSYCHOTIC, OR MOOD DISTURBANCE OR ANXIETY (MULTI): ICD-10-CM

## 2023-10-03 DIAGNOSIS — M19.90 OSTEOARTHRITIS, UNSPECIFIED OSTEOARTHRITIS TYPE, UNSPECIFIED SITE: ICD-10-CM

## 2023-10-03 DIAGNOSIS — E78.5 HYPERLIPIDEMIA, UNSPECIFIED HYPERLIPIDEMIA TYPE: ICD-10-CM

## 2023-10-03 PROCEDURE — 99348 HOME/RES VST EST LOW MDM 30: CPT | Performed by: INTERNAL MEDICINE

## 2023-10-03 NOTE — LETTER
Patient: Gabby Espino  : 1945    Encounter Date: 10/03/2023    PROGRESS NOTE    Subjective  Chief complaint: Gabby Espino is a 78 y.o. female who is an assisted living facility patient being seen and evaluated for monthly general medical care and follow-up.    HPI:  Patient presents for general medical care and f/u.  Patient seen and examined at bedside.  No issues per nursing.  Patient has no acute complaints. Patient has a diagnosis of hyperlipidemia.   denies any muscle aches.  Patient is a diagnosis of osteoarthritis.  Denies any pain or discomfort.  Patient has dementia and requires assist with ADLs.  No acute distress.      Objective  Vital signs: 101/65,84    Physical Exam  Constitutional:       General: She is not in acute distress.  Eyes:      Extraocular Movements: Extraocular movements intact.   Cardiovascular:      Rate and Rhythm: Regular rhythm.   Pulmonary:      Effort: Pulmonary effort is normal.      Breath sounds: Normal breath sounds.   Abdominal:      General: Bowel sounds are normal.      Palpations: Abdomen is soft.   Musculoskeletal:      Cervical back: Neck supple.      Right lower leg: No edema.      Left lower leg: No edema.   Neurological:      Mental Status: She is alert.   Psychiatric:         Mood and Affect: Mood normal.         Behavior: Behavior is cooperative.         Assessment/Plan  Problem List Items Addressed This Visit       Dementia (CMS/[facility])     Mentation at baseline.    Continue Aricept.    Monitor for changes in behavior.         HLD (hyperlipidemia)     Continue atorvastatin.  Monitor labs routinely         OA (osteoarthritis)     Pain is controlled.    Monitor for changes.          Medications, treatments, and labs reviewed  Continue medications and treatments as listed in EMR    Scribe Attestation  DERIAN, Darby Rizzo   attest that this documentation has been prepared under the direction and in the presence of Krissy Tillman MD.     Provider  Attestation - Scribe documentation  All medical record entries made by the Scribe were at my direction and personally dictated by me. I have reviewed the chart and agree that the record accurately reflects my personal performance of the history, physical exam, discussion and plan.     Krissy Tillman MD      Electronically Signed By: Krissy Tillman MD   10/3/23  5:20 PM

## 2023-10-03 NOTE — PROGRESS NOTES
PROGRESS NOTE    Subjective   Chief complaint: Gabby Espino is a 78 y.o. female who is an assisted living facility patient being seen and evaluated for monthly general medical care and follow-up.    HPI:  Patient presents for general medical care and f/u.  Patient seen and examined at bedside.  No issues per nursing.  Patient has no acute complaints. Patient has a diagnosis of hyperlipidemia.   denies any muscle aches.  Patient is a diagnosis of osteoarthritis.  Denies any pain or discomfort.  Patient has dementia and requires assist with ADLs.  No acute distress.      Objective   Vital signs: 101/65,84    Physical Exam  Constitutional:       General: She is not in acute distress.  Eyes:      Extraocular Movements: Extraocular movements intact.   Cardiovascular:      Rate and Rhythm: Regular rhythm.   Pulmonary:      Effort: Pulmonary effort is normal.      Breath sounds: Normal breath sounds.   Abdominal:      General: Bowel sounds are normal.      Palpations: Abdomen is soft.   Musculoskeletal:      Cervical back: Neck supple.      Right lower leg: No edema.      Left lower leg: No edema.   Neurological:      Mental Status: She is alert.   Psychiatric:         Mood and Affect: Mood normal.         Behavior: Behavior is cooperative.         Assessment/Plan   Problem List Items Addressed This Visit       Dementia (CMS/[facility])     Mentation at baseline.    Continue Aricept.    Monitor for changes in behavior.         HLD (hyperlipidemia)     Continue atorvastatin.  Monitor labs routinely         OA (osteoarthritis)     Pain is controlled.    Monitor for changes.          Medications, treatments, and labs reviewed  Continue medications and treatments as listed in EMR    Scribe Attestation  DERIAN, Darby Rizzo   attest that this documentation has been prepared under the direction and in the presence of Krissy Tillman MD.     Provider Attestation - Scribe documentation  All medical record entries made by the  Scribe were at my direction and personally dictated by me. I have reviewed the chart and agree that the record accurately reflects my personal performance of the history, physical exam, discussion and plan.     Krissy Tillman MD

## 2023-11-07 ENCOUNTER — NURSING HOME VISIT (OUTPATIENT)
Dept: POST ACUTE CARE | Facility: EXTERNAL LOCATION | Age: 78
End: 2023-11-07
Payer: MEDICARE

## 2023-11-07 DIAGNOSIS — E78.5 HYPERLIPIDEMIA, UNSPECIFIED HYPERLIPIDEMIA TYPE: ICD-10-CM

## 2023-11-07 DIAGNOSIS — M19.90 OSTEOARTHRITIS, UNSPECIFIED OSTEOARTHRITIS TYPE, UNSPECIFIED SITE: ICD-10-CM

## 2023-11-07 DIAGNOSIS — F03.B0 MODERATE DEMENTIA, UNSPECIFIED DEMENTIA TYPE, UNSPECIFIED WHETHER BEHAVIORAL, PSYCHOTIC, OR MOOD DISTURBANCE OR ANXIETY (MULTI): ICD-10-CM

## 2023-11-07 PROCEDURE — 99348 HOME/RES VST EST LOW MDM 30: CPT | Performed by: INTERNAL MEDICINE

## 2023-11-07 NOTE — PROGRESS NOTES
PROGRESS NOTE    Subjective   Chief complaint: Gabby Espino is a 78 y.o. female who is an assisted living facility patient being seen and evaluated for monthly general medical care and follow-up.    HPI:  Patient presents for general medical care and f/u.  Patient seen and examined at bedside.  No issues per nursing.  Patient has no acute complaints. Denies chest pain and headache.  Patient has a diagnosis of hyperlipidemia.   denies any muscle aches.  Patient is a diagnosis of osteoarthritis.  Denies any pain or discomfort.  Patient has dementia and requires assist with ADLs.  No acute distress.      Objective   Vital signs: 157/96,73,99%    Physical Exam  Constitutional:       General: She is not in acute distress.  Eyes:      Extraocular Movements: Extraocular movements intact.   Cardiovascular:      Rate and Rhythm: Regular rhythm.   Pulmonary:      Effort: Pulmonary effort is normal.      Breath sounds: Normal breath sounds.   Abdominal:      General: Bowel sounds are normal.      Palpations: Abdomen is soft.   Musculoskeletal:      Cervical back: Neck supple.      Right lower leg: No edema.      Left lower leg: No edema.   Neurological:      Mental Status: She is alert.   Psychiatric:         Mood and Affect: Mood normal.         Behavior: Behavior is cooperative.         Assessment/Plan   Problem List Items Addressed This Visit       Dementia (CMS/[facility])     Mentation at baseline.    Continue Aricept.    Monitor for changes in behavior.         HLD (hyperlipidemia)     Continue atorvastatin.  Monitor labs routinely         OA (osteoarthritis)     Pain is controlled.    Monitor for changes.          Medications, treatments, and labs reviewed  Continue medications and treatments as listed in EMR    Scribe Attestation  I, Darby Rizoz   attest that this documentation has been prepared under the direction and in the presence of Krissy Tillman MD.     Provider Attestation - Scribe documentation  All  medical record entries made by the Scribe were at my direction and personally dictated by me. I have reviewed the chart and agree that the record accurately reflects my personal performance of the history, physical exam, discussion and plan.     Krissy Tillman MD

## 2023-11-07 NOTE — LETTER
Patient: Gabby Espino  : 1945    Encounter Date: 2023    PROGRESS NOTE    Subjective  Chief complaint: Gabby Espino is a 78 y.o. female who is an assisted living facility patient being seen and evaluated for monthly general medical care and follow-up.    HPI:  Patient presents for general medical care and f/u.  Patient seen and examined at bedside.  No issues per nursing.  Patient has no acute complaints. Denies chest pain and headache.  Patient has a diagnosis of hyperlipidemia.   denies any muscle aches.  Patient is a diagnosis of osteoarthritis.  Denies any pain or discomfort.  Patient has dementia and requires assist with ADLs.  No acute distress.      Objective  Vital signs: 157/96,73,99%    Physical Exam  Constitutional:       General: She is not in acute distress.  Eyes:      Extraocular Movements: Extraocular movements intact.   Cardiovascular:      Rate and Rhythm: Regular rhythm.   Pulmonary:      Effort: Pulmonary effort is normal.      Breath sounds: Normal breath sounds.   Abdominal:      General: Bowel sounds are normal.      Palpations: Abdomen is soft.   Musculoskeletal:      Cervical back: Neck supple.      Right lower leg: No edema.      Left lower leg: No edema.   Neurological:      Mental Status: She is alert.   Psychiatric:         Mood and Affect: Mood normal.         Behavior: Behavior is cooperative.         Assessment/Plan  Problem List Items Addressed This Visit       Dementia (CMS/[facility])     Mentation at baseline.    Continue Aricept.    Monitor for changes in behavior.         HLD (hyperlipidemia)     Continue atorvastatin.  Monitor labs routinely         OA (osteoarthritis)     Pain is controlled.    Monitor for changes.          Medications, treatments, and labs reviewed  Continue medications and treatments as listed in EMR    Scribe Attestation  I, Darby Rizzo   attest that this documentation has been prepared under the direction and in the presence of  Krissy Tillman MD.     Provider Attestation - Scribe documentation  All medical record entries made by the Scribe were at my direction and personally dictated by me. I have reviewed the chart and agree that the record accurately reflects my personal performance of the history, physical exam, discussion and plan.     Krissy Tillman MD      Electronically Signed By: Krissy Tillman MD   11/7/23  5:07 PM

## 2023-11-08 ENCOUNTER — APPOINTMENT (OUTPATIENT)
Dept: RADIOLOGY | Facility: HOSPITAL | Age: 78
DRG: 689 | End: 2023-11-08
Payer: MEDICARE

## 2023-11-08 ENCOUNTER — HOSPITAL ENCOUNTER (OUTPATIENT)
Facility: HOSPITAL | Age: 78
Setting detail: OBSERVATION
LOS: 1 days | Discharge: OTHER INSTITUTION | DRG: 689 | End: 2023-11-11
Attending: GENERAL PRACTICE | Admitting: HOSPITALIST
Payer: MEDICARE

## 2023-11-08 DIAGNOSIS — E78.5 HYPERLIPIDEMIA, UNSPECIFIED HYPERLIPIDEMIA TYPE: ICD-10-CM

## 2023-11-08 DIAGNOSIS — A49.9 UTI (URINARY TRACT INFECTION), BACTERIAL: ICD-10-CM

## 2023-11-08 DIAGNOSIS — N39.0 UTI (URINARY TRACT INFECTION), BACTERIAL: ICD-10-CM

## 2023-11-08 DIAGNOSIS — G93.41 ACUTE METABOLIC ENCEPHALOPATHY: Primary | ICD-10-CM

## 2023-11-08 LAB
ALBUMIN SERPL BCP-MCNC: 3 G/DL (ref 3.4–5)
ALP SERPL-CCNC: 36 U/L (ref 33–136)
ALT SERPL W P-5'-P-CCNC: 25 U/L (ref 7–45)
ANION GAP SERPL CALC-SCNC: 11 MMOL/L (ref 10–20)
APPEARANCE UR: ABNORMAL
AST SERPL W P-5'-P-CCNC: 63 U/L (ref 9–39)
BACTERIA #/AREA URNS AUTO: ABNORMAL /HPF
BASOPHILS # BLD AUTO: 0.03 X10*3/UL (ref 0–0.1)
BASOPHILS NFR BLD AUTO: 0.2 %
BILIRUB SERPL-MCNC: 1 MG/DL (ref 0–1.2)
BILIRUB UR STRIP.AUTO-MCNC: NEGATIVE MG/DL
BUN SERPL-MCNC: 20 MG/DL (ref 6–23)
CALCIUM SERPL-MCNC: 6.1 MG/DL (ref 8.6–10.3)
CARDIAC TROPONIN I PNL SERPL HS: 51 NG/L (ref 0–13)
CARDIAC TROPONIN I PNL SERPL HS: 77 NG/L (ref 0–13)
CARDIAC TROPONIN I PNL SERPL HS: 92 NG/L (ref 0–13)
CHLORIDE SERPL-SCNC: 116 MMOL/L (ref 98–107)
CO2 SERPL-SCNC: 19 MMOL/L (ref 21–32)
COLOR UR: ABNORMAL
CREAT SERPL-MCNC: 0.59 MG/DL (ref 0.5–1.05)
EOSINOPHIL # BLD AUTO: 0 X10*3/UL (ref 0–0.4)
EOSINOPHIL NFR BLD AUTO: 0 %
ERYTHROCYTE [DISTWIDTH] IN BLOOD BY AUTOMATED COUNT: 17.5 % (ref 11.5–14.5)
GFR SERPL CREATININE-BSD FRML MDRD: >90 ML/MIN/1.73M*2
GLUCOSE SERPL-MCNC: 99 MG/DL (ref 74–99)
GLUCOSE UR STRIP.AUTO-MCNC: NEGATIVE MG/DL
HCT VFR BLD AUTO: 51 % (ref 36–46)
HGB BLD-MCNC: 16.3 G/DL (ref 12–16)
IMM GRANULOCYTES # BLD AUTO: 0.09 X10*3/UL (ref 0–0.5)
IMM GRANULOCYTES NFR BLD AUTO: 0.6 % (ref 0–0.9)
KETONES UR STRIP.AUTO-MCNC: ABNORMAL MG/DL
LEUKOCYTE ESTERASE UR QL STRIP.AUTO: ABNORMAL
LYMPHOCYTES # BLD AUTO: 0.65 X10*3/UL (ref 0.8–3)
LYMPHOCYTES NFR BLD AUTO: 4 %
MCH RBC QN AUTO: 29 PG (ref 26–34)
MCHC RBC AUTO-ENTMCNC: 32 G/DL (ref 32–36)
MCV RBC AUTO: 91 FL (ref 80–100)
MONOCYTES # BLD AUTO: 1.17 X10*3/UL (ref 0.05–0.8)
MONOCYTES NFR BLD AUTO: 7.2 %
MUCOUS THREADS #/AREA URNS AUTO: ABNORMAL /LPF
NEUTROPHILS # BLD AUTO: 14.41 X10*3/UL (ref 1.6–5.5)
NEUTROPHILS NFR BLD AUTO: 88 %
NITRITE UR QL STRIP.AUTO: NEGATIVE
NRBC BLD-RTO: 0 /100 WBCS (ref 0–0)
PH UR STRIP.AUTO: 5 [PH]
PLATELET # BLD AUTO: 264 X10*3/UL (ref 150–450)
POTASSIUM SERPL-SCNC: 3.7 MMOL/L (ref 3.5–5.3)
PROT SERPL-MCNC: 5 G/DL (ref 6.4–8.2)
PROT UR STRIP.AUTO-MCNC: ABNORMAL MG/DL
RBC # BLD AUTO: 5.63 X10*6/UL (ref 4–5.2)
RBC # UR STRIP.AUTO: ABNORMAL /UL
RBC #/AREA URNS AUTO: >20 /HPF
SODIUM SERPL-SCNC: 142 MMOL/L (ref 136–145)
SP GR UR STRIP.AUTO: 1.02
UROBILINOGEN UR STRIP.AUTO-MCNC: <2 MG/DL
WBC # BLD AUTO: 16.4 X10*3/UL (ref 4.4–11.3)
WBC #/AREA URNS AUTO: >50 /HPF

## 2023-11-08 PROCEDURE — 85025 COMPLETE CBC W/AUTO DIFF WBC: CPT | Performed by: GENERAL PRACTICE

## 2023-11-08 PROCEDURE — 87086 URINE CULTURE/COLONY COUNT: CPT | Mod: AHULAB | Performed by: GENERAL PRACTICE

## 2023-11-08 PROCEDURE — 81001 URINALYSIS AUTO W/SCOPE: CPT | Performed by: GENERAL PRACTICE

## 2023-11-08 PROCEDURE — G0378 HOSPITAL OBSERVATION PER HR: HCPCS

## 2023-11-08 PROCEDURE — 70450 CT HEAD/BRAIN W/O DYE: CPT | Performed by: RADIOLOGY

## 2023-11-08 PROCEDURE — 71045 X-RAY EXAM CHEST 1 VIEW: CPT | Performed by: RADIOLOGY

## 2023-11-08 PROCEDURE — 72125 CT NECK SPINE W/O DYE: CPT

## 2023-11-08 PROCEDURE — 96365 THER/PROPH/DIAG IV INF INIT: CPT

## 2023-11-08 PROCEDURE — 72125 CT NECK SPINE W/O DYE: CPT | Performed by: RADIOLOGY

## 2023-11-08 PROCEDURE — 2500000004 HC RX 250 GENERAL PHARMACY W/ HCPCS (ALT 636 FOR OP/ED): Performed by: HOSPITALIST

## 2023-11-08 PROCEDURE — 80053 COMPREHEN METABOLIC PANEL: CPT | Performed by: GENERAL PRACTICE

## 2023-11-08 PROCEDURE — 71045 X-RAY EXAM CHEST 1 VIEW: CPT | Mod: FY

## 2023-11-08 PROCEDURE — 36415 COLL VENOUS BLD VENIPUNCTURE: CPT | Performed by: GENERAL PRACTICE

## 2023-11-08 PROCEDURE — 1100000001 HC PRIVATE ROOM DAILY

## 2023-11-08 PROCEDURE — 84484 ASSAY OF TROPONIN QUANT: CPT | Performed by: GENERAL PRACTICE

## 2023-11-08 PROCEDURE — 99223 1ST HOSP IP/OBS HIGH 75: CPT | Performed by: HOSPITALIST

## 2023-11-08 PROCEDURE — 2500000004 HC RX 250 GENERAL PHARMACY W/ HCPCS (ALT 636 FOR OP/ED): Performed by: GENERAL PRACTICE

## 2023-11-08 PROCEDURE — 99285 EMERGENCY DEPT VISIT HI MDM: CPT | Mod: 25 | Performed by: GENERAL PRACTICE

## 2023-11-08 PROCEDURE — 2500000001 HC RX 250 WO HCPCS SELF ADMINISTERED DRUGS (ALT 637 FOR MEDICARE OP): Performed by: HOSPITALIST

## 2023-11-08 PROCEDURE — 70450 CT HEAD/BRAIN W/O DYE: CPT

## 2023-11-08 RX ORDER — CEFTRIAXONE 1 G/50ML
1 INJECTION, SOLUTION INTRAVENOUS EVERY 24 HOURS
Status: DISCONTINUED | OUTPATIENT
Start: 2023-11-09 | End: 2023-11-11 | Stop reason: HOSPADM

## 2023-11-08 RX ORDER — CHOLECALCIFEROL (VITAMIN D3) 25 MCG
1000 TABLET ORAL DAILY
Status: DISCONTINUED | OUTPATIENT
Start: 2023-11-09 | End: 2023-11-11 | Stop reason: HOSPADM

## 2023-11-08 RX ORDER — QUETIAPINE FUMARATE 25 MG/1
25 TABLET, FILM COATED ORAL NIGHTLY
Status: DISCONTINUED | OUTPATIENT
Start: 2023-11-08 | End: 2023-11-11 | Stop reason: HOSPADM

## 2023-11-08 RX ORDER — POLYETHYLENE GLYCOL 3350 17 G/17G
17 POWDER, FOR SOLUTION ORAL DAILY
Status: DISCONTINUED | OUTPATIENT
Start: 2023-11-08 | End: 2023-11-11 | Stop reason: HOSPADM

## 2023-11-08 RX ORDER — ONDANSETRON HYDROCHLORIDE 2 MG/ML
4 INJECTION, SOLUTION INTRAVENOUS EVERY 8 HOURS PRN
Status: DISCONTINUED | OUTPATIENT
Start: 2023-11-08 | End: 2023-11-11 | Stop reason: HOSPADM

## 2023-11-08 RX ORDER — ACETAMINOPHEN 325 MG/1
650 TABLET ORAL EVERY 4 HOURS PRN
Status: DISCONTINUED | OUTPATIENT
Start: 2023-11-08 | End: 2023-11-11 | Stop reason: HOSPADM

## 2023-11-08 RX ORDER — SODIUM CHLORIDE 9 MG/ML
75 INJECTION, SOLUTION INTRAVENOUS CONTINUOUS
Status: DISCONTINUED | OUTPATIENT
Start: 2023-11-08 | End: 2023-11-11 | Stop reason: HOSPADM

## 2023-11-08 RX ORDER — CEFTRIAXONE 1 G/50ML
1 INJECTION, SOLUTION INTRAVENOUS ONCE
Status: COMPLETED | OUTPATIENT
Start: 2023-11-08 | End: 2023-11-08

## 2023-11-08 RX ORDER — ENOXAPARIN SODIUM 100 MG/ML
40 INJECTION SUBCUTANEOUS EVERY 24 HOURS
Status: DISCONTINUED | OUTPATIENT
Start: 2023-11-09 | End: 2023-11-11 | Stop reason: HOSPADM

## 2023-11-08 RX ORDER — ONDANSETRON 4 MG/1
4 TABLET, ORALLY DISINTEGRATING ORAL EVERY 8 HOURS PRN
Status: DISCONTINUED | OUTPATIENT
Start: 2023-11-08 | End: 2023-11-11 | Stop reason: HOSPADM

## 2023-11-08 RX ORDER — ATORVASTATIN CALCIUM 10 MG/1
10 TABLET, FILM COATED ORAL NIGHTLY
Status: DISCONTINUED | OUTPATIENT
Start: 2023-11-08 | End: 2023-11-11 | Stop reason: HOSPADM

## 2023-11-08 RX ORDER — DONEPEZIL HYDROCHLORIDE 5 MG/1
10 TABLET, FILM COATED ORAL NIGHTLY
Status: DISCONTINUED | OUTPATIENT
Start: 2023-11-08 | End: 2023-11-11 | Stop reason: HOSPADM

## 2023-11-08 RX ORDER — TALC
3 POWDER (GRAM) TOPICAL DAILY
Status: DISCONTINUED | OUTPATIENT
Start: 2023-11-08 | End: 2023-11-11 | Stop reason: HOSPADM

## 2023-11-08 RX ADMIN — CEFTRIAXONE SODIUM 1 G: 1 INJECTION, SOLUTION INTRAVENOUS at 15:28

## 2023-11-08 RX ADMIN — Medication 3 MG: at 22:30

## 2023-11-08 RX ADMIN — POLYETHYLENE GLYCOL 3350 17 G: 17 POWDER, FOR SOLUTION ORAL at 22:29

## 2023-11-08 RX ADMIN — SODIUM CHLORIDE 75 ML/HR: 9 INJECTION, SOLUTION INTRAVENOUS at 22:42

## 2023-11-08 RX ADMIN — QUETIAPINE FUMARATE 25 MG: 25 TABLET ORAL at 22:30

## 2023-11-08 RX ADMIN — ATORVASTATIN CALCIUM 10 MG: 10 TABLET, FILM COATED ORAL at 22:30

## 2023-11-08 RX ADMIN — DONEPEZIL HYDROCHLORIDE 10 MG: 5 TABLET ORAL at 22:30

## 2023-11-08 SDOH — SOCIAL STABILITY: SOCIAL INSECURITY: DOES ANYONE TRY TO KEEP YOU FROM HAVING/CONTACTING OTHER FRIENDS OR DOING THINGS OUTSIDE YOUR HOME?: NO

## 2023-11-08 SDOH — SOCIAL STABILITY: SOCIAL INSECURITY: DO YOU FEEL ANYONE HAS EXPLOITED OR TAKEN ADVANTAGE OF YOU FINANCIALLY OR OF YOUR PERSONAL PROPERTY?: NO

## 2023-11-08 SDOH — SOCIAL STABILITY: SOCIAL INSECURITY: ARE YOU OR HAVE YOU BEEN THREATENED OR ABUSED PHYSICALLY, EMOTIONALLY, OR SEXUALLY BY ANYONE?: NO

## 2023-11-08 SDOH — SOCIAL STABILITY: SOCIAL INSECURITY: ABUSE: ADULT

## 2023-11-08 SDOH — SOCIAL STABILITY: SOCIAL INSECURITY: WERE YOU ABLE TO COMPLETE ALL THE BEHAVIORAL HEALTH SCREENINGS?: YES

## 2023-11-08 SDOH — SOCIAL STABILITY: SOCIAL INSECURITY: ARE THERE ANY APPARENT SIGNS OF INJURIES/BEHAVIORS THAT COULD BE RELATED TO ABUSE/NEGLECT?: NO

## 2023-11-08 SDOH — SOCIAL STABILITY: SOCIAL INSECURITY: DO YOU FEEL UNSAFE GOING BACK TO THE PLACE WHERE YOU ARE LIVING?: NO

## 2023-11-08 SDOH — SOCIAL STABILITY: SOCIAL INSECURITY: HAVE YOU HAD THOUGHTS OF HARMING ANYONE ELSE?: NO

## 2023-11-08 SDOH — SOCIAL STABILITY: SOCIAL INSECURITY: HAS ANYONE EVER THREATENED TO HURT YOUR FAMILY OR YOUR PETS?: NO

## 2023-11-08 ASSESSMENT — LIFESTYLE VARIABLES
HOW OFTEN DO YOU HAVE A DRINK CONTAINING ALCOHOL: MONTHLY OR LESS
SKIP TO QUESTIONS 9-10: 1
AUDIT-C TOTAL SCORE: 1
AUDIT-C TOTAL SCORE: 1
HOW MANY STANDARD DRINKS CONTAINING ALCOHOL DO YOU HAVE ON A TYPICAL DAY: 1 OR 2
HOW OFTEN DO YOU HAVE 6 OR MORE DRINKS ON ONE OCCASION: NEVER

## 2023-11-08 ASSESSMENT — ACTIVITIES OF DAILY LIVING (ADL)
GROOMING: INDEPENDENT
PATIENT'S MEMORY ADEQUATE TO SAFELY COMPLETE DAILY ACTIVITIES?: YES
HEARING - LEFT EAR: FUNCTIONAL
FEEDING YOURSELF: INDEPENDENT
WALKS IN HOME: INDEPENDENT
JUDGMENT_ADEQUATE_SAFELY_COMPLETE_DAILY_ACTIVITIES: YES
HEARING - RIGHT EAR: FUNCTIONAL
ADEQUATE_TO_COMPLETE_ADL: YES
BATHING: INDEPENDENT
DRESSING YOURSELF: INDEPENDENT
TOILETING: INDEPENDENT
LACK_OF_TRANSPORTATION: NO

## 2023-11-08 ASSESSMENT — COGNITIVE AND FUNCTIONAL STATUS - GENERAL
MOBILITY SCORE: 22
WALKING IN HOSPITAL ROOM: A LITTLE
CLIMB 3 TO 5 STEPS WITH RAILING: A LITTLE
CLIMB 3 TO 5 STEPS WITH RAILING: A LITTLE
DAILY ACTIVITIY SCORE: 24
PATIENT BASELINE BEDBOUND: NO
PATIENT BASELINE BEDBOUND: NO
WALKING IN HOSPITAL ROOM: A LITTLE
MOBILITY SCORE: 22

## 2023-11-08 ASSESSMENT — COLUMBIA-SUICIDE SEVERITY RATING SCALE - C-SSRS
1. IN THE PAST MONTH, HAVE YOU WISHED YOU WERE DEAD OR WISHED YOU COULD GO TO SLEEP AND NOT WAKE UP?: NO
6. HAVE YOU EVER DONE ANYTHING, STARTED TO DO ANYTHING, OR PREPARED TO DO ANYTHING TO END YOUR LIFE?: NO
2. HAVE YOU ACTUALLY HAD ANY THOUGHTS OF KILLING YOURSELF?: NO

## 2023-11-08 ASSESSMENT — PATIENT HEALTH QUESTIONNAIRE - PHQ9
1. LITTLE INTEREST OR PLEASURE IN DOING THINGS: NOT AT ALL
SUM OF ALL RESPONSES TO PHQ9 QUESTIONS 1 & 2: 0
2. FEELING DOWN, DEPRESSED OR HOPELESS: NOT AT ALL

## 2023-11-09 LAB
ANION GAP SERPL CALC-SCNC: 12 MMOL/L (ref 10–20)
BACTERIA UR CULT: ABNORMAL
BUN SERPL-MCNC: 24 MG/DL (ref 6–23)
CALCIUM SERPL-MCNC: 8.8 MG/DL (ref 8.6–10.3)
CARDIAC TROPONIN I PNL SERPL HS: 40 NG/L (ref 0–13)
CHLORIDE SERPL-SCNC: 107 MMOL/L (ref 98–107)
CO2 SERPL-SCNC: 26 MMOL/L (ref 21–32)
CREAT SERPL-MCNC: 0.87 MG/DL (ref 0.5–1.05)
ERYTHROCYTE [DISTWIDTH] IN BLOOD BY AUTOMATED COUNT: 17 % (ref 11.5–14.5)
GFR SERPL CREATININE-BSD FRML MDRD: 68 ML/MIN/1.73M*2
GLUCOSE SERPL-MCNC: 94 MG/DL (ref 74–99)
HCT VFR BLD AUTO: 41.8 % (ref 36–46)
HGB BLD-MCNC: 13.9 G/DL (ref 12–16)
MCH RBC QN AUTO: 29.8 PG (ref 26–34)
MCHC RBC AUTO-ENTMCNC: 33.3 G/DL (ref 32–36)
MCV RBC AUTO: 90 FL (ref 80–100)
NRBC BLD-RTO: 0 /100 WBCS (ref 0–0)
PLATELET # BLD AUTO: 212 X10*3/UL (ref 150–450)
POTASSIUM SERPL-SCNC: 4 MMOL/L (ref 3.5–5.3)
RBC # BLD AUTO: 4.66 X10*6/UL (ref 4–5.2)
SODIUM SERPL-SCNC: 141 MMOL/L (ref 136–145)
WBC # BLD AUTO: 10.7 X10*3/UL (ref 4.4–11.3)

## 2023-11-09 PROCEDURE — 36415 COLL VENOUS BLD VENIPUNCTURE: CPT | Performed by: HOSPITALIST

## 2023-11-09 PROCEDURE — 85027 COMPLETE CBC AUTOMATED: CPT | Performed by: HOSPITALIST

## 2023-11-09 PROCEDURE — 2500000004 HC RX 250 GENERAL PHARMACY W/ HCPCS (ALT 636 FOR OP/ED): Mod: MUE | Performed by: HOSPITALIST

## 2023-11-09 PROCEDURE — 84484 ASSAY OF TROPONIN QUANT: CPT | Performed by: HOSPITALIST

## 2023-11-09 PROCEDURE — G0378 HOSPITAL OBSERVATION PER HR: HCPCS

## 2023-11-09 PROCEDURE — 99233 SBSQ HOSP IP/OBS HIGH 50: CPT | Performed by: INTERNAL MEDICINE

## 2023-11-09 PROCEDURE — 2500000001 HC RX 250 WO HCPCS SELF ADMINISTERED DRUGS (ALT 637 FOR MEDICARE OP): Performed by: HOSPITALIST

## 2023-11-09 PROCEDURE — 80048 BASIC METABOLIC PNL TOTAL CA: CPT | Performed by: HOSPITALIST

## 2023-11-09 PROCEDURE — 1100000001 HC PRIVATE ROOM DAILY

## 2023-11-09 PROCEDURE — 99222 1ST HOSP IP/OBS MODERATE 55: CPT | Performed by: INTERNAL MEDICINE

## 2023-11-09 PROCEDURE — 96372 THER/PROPH/DIAG INJ SC/IM: CPT | Performed by: HOSPITALIST

## 2023-11-09 PROCEDURE — 94760 N-INVAS EAR/PLS OXIMETRY 1: CPT

## 2023-11-09 RX ADMIN — DONEPEZIL HYDROCHLORIDE 10 MG: 5 TABLET ORAL at 20:35

## 2023-11-09 RX ADMIN — SODIUM CHLORIDE 75 ML/HR: 9 INJECTION, SOLUTION INTRAVENOUS at 10:55

## 2023-11-09 RX ADMIN — ATORVASTATIN CALCIUM 10 MG: 10 TABLET, FILM COATED ORAL at 20:35

## 2023-11-09 RX ADMIN — ENOXAPARIN SODIUM 40 MG: 40 INJECTION SUBCUTANEOUS at 09:28

## 2023-11-09 RX ADMIN — POLYETHYLENE GLYCOL 3350 17 G: 17 POWDER, FOR SOLUTION ORAL at 09:29

## 2023-11-09 RX ADMIN — Medication 3 MG: at 20:35

## 2023-11-09 RX ADMIN — QUETIAPINE FUMARATE 25 MG: 25 TABLET ORAL at 20:35

## 2023-11-09 RX ADMIN — CEFTRIAXONE SODIUM 1 G: 1 INJECTION, SOLUTION INTRAVENOUS at 16:59

## 2023-11-09 RX ADMIN — Medication 1000 UNITS: at 09:28

## 2023-11-09 ASSESSMENT — PAIN - FUNCTIONAL ASSESSMENT
PAIN_FUNCTIONAL_ASSESSMENT: 0-10
PAIN_FUNCTIONAL_ASSESSMENT: 0-10

## 2023-11-09 ASSESSMENT — ENCOUNTER SYMPTOMS
GASTROINTESTINAL NEGATIVE: 1
ALLERGIC/IMMUNOLOGIC NEGATIVE: 1
MUSCULOSKELETAL NEGATIVE: 1
CARDIOVASCULAR NEGATIVE: 1
PSYCHIATRIC NEGATIVE: 1
NEUROLOGICAL NEGATIVE: 1
EYES NEGATIVE: 1
RESPIRATORY NEGATIVE: 1
HEMATOLOGIC/LYMPHATIC NEGATIVE: 1
CONSTITUTIONAL NEGATIVE: 1

## 2023-11-09 ASSESSMENT — COGNITIVE AND FUNCTIONAL STATUS - GENERAL
MOBILITY SCORE: 18
CLIMB 3 TO 5 STEPS WITH RAILING: A LITTLE
TOILETING: A LITTLE
DRESSING REGULAR LOWER BODY CLOTHING: A LITTLE
WALKING IN HOSPITAL ROOM: A LITTLE
TURNING FROM BACK TO SIDE WHILE IN FLAT BAD: A LITTLE
DAILY ACTIVITIY SCORE: 19
MOVING FROM LYING ON BACK TO SITTING ON SIDE OF FLAT BED WITH BEDRAILS: A LITTLE
HELP NEEDED FOR BATHING: A LITTLE
DRESSING REGULAR UPPER BODY CLOTHING: A LITTLE
STANDING UP FROM CHAIR USING ARMS: A LITTLE
MOVING TO AND FROM BED TO CHAIR: A LITTLE
PERSONAL GROOMING: A LITTLE

## 2023-11-09 ASSESSMENT — PAIN SCALES - GENERAL
PAINLEVEL_OUTOF10: 0 - NO PAIN
PAINLEVEL_OUTOF10: 4

## 2023-11-09 ASSESSMENT — ACTIVITIES OF DAILY LIVING (ADL): LACK_OF_TRANSPORTATION: NO

## 2023-11-09 NOTE — PROGRESS NOTES
Gabby Espino is a 78 y.o. female on day 1 of admission presenting with Acute metabolic encephalopathy.      Subjective   Pt seen and examined.        Objective     Last Recorded Vitals  /70 (Patient Position: Lying)   Pulse 59   Temp 36.2 °C (97.2 °F) (Oral)   Resp 18   Wt 69.8 kg (153 lb 12.8 oz)   SpO2 98%   Intake/Output last 3 Shifts:    Intake/Output Summary (Last 24 hours) at 11/9/2023 1033  Last data filed at 11/8/2023 1558  Gross per 24 hour   Intake 50 ml   Output --   Net 50 ml       Admission Weight  Weight: 68 kg (150 lb) (11/08/23 1149)    Daily Weight  11/08/23 : 69.8 kg (153 lb 12.8 oz)      Physical Exam  Constitutional:       Appearance: Normal appearance. She is obese.   HENT:      Head: Normocephalic and atraumatic.      Mouth/Throat:      Mouth: Mucous membranes are moist.   Eyes:      Extraocular Movements: Extraocular movements intact.      Conjunctiva/sclera: Conjunctivae normal.      Pupils: Pupils are equal, round, and reactive to light.   Cardiovascular:      Rate and Rhythm: Normal rate and regular rhythm.      Pulses: Normal pulses.      Heart sounds: Normal heart sounds.   Pulmonary:      Effort: Pulmonary effort is normal.      Breath sounds: Normal breath sounds.   Abdominal:      General: Abdomen is flat. Bowel sounds are normal.      Palpations: Abdomen is soft.   Musculoskeletal:         General: Normal range of motion.   Skin:     General: Skin is warm and dry.      Comments: No decubitus ulcers   Neurological:      General: No focal deficit present.      Mental Status: She is alert.      Comments: Oriented to person only   Psychiatric:         Mood and Affect: Mood normal.         Behavior: Behavior normal.         Thought Content: Thought content normal.         Judgment: Judgment normal.   Relevant Results  Results for orders placed or performed during the hospital encounter of 11/08/23 (from the past 24 hour(s))   CBC and Auto Differential   Result Value Ref Range     WBC 16.4 (H) 4.4 - 11.3 x10*3/uL    nRBC 0.0 0.0 - 0.0 /100 WBCs    RBC 5.63 (H) 4.00 - 5.20 x10*6/uL    Hemoglobin 16.3 (H) 12.0 - 16.0 g/dL    Hematocrit 51.0 (H) 36.0 - 46.0 %    MCV 91 80 - 100 fL    MCH 29.0 26.0 - 34.0 pg    MCHC 32.0 32.0 - 36.0 g/dL    RDW 17.5 (H) 11.5 - 14.5 %    Platelets 264 150 - 450 x10*3/uL    Neutrophils % 88.0 40.0 - 80.0 %    Immature Granulocytes %, Automated 0.6 0.0 - 0.9 %    Lymphocytes % 4.0 13.0 - 44.0 %    Monocytes % 7.2 2.0 - 10.0 %    Eosinophils % 0.0 0.0 - 6.0 %    Basophils % 0.2 0.0 - 2.0 %    Neutrophils Absolute 14.41 (H) 1.60 - 5.50 x10*3/uL    Immature Granulocytes Absolute, Automated 0.09 0.00 - 0.50 x10*3/uL    Lymphocytes Absolute 0.65 (L) 0.80 - 3.00 x10*3/uL    Monocytes Absolute 1.17 (H) 0.05 - 0.80 x10*3/uL    Eosinophils Absolute 0.00 0.00 - 0.40 x10*3/uL    Basophils Absolute 0.03 0.00 - 0.10 x10*3/uL   Comprehensive metabolic panel   Result Value Ref Range    Glucose 99 74 - 99 mg/dL    Sodium 142 136 - 145 mmol/L    Potassium 3.7 3.5 - 5.3 mmol/L    Chloride 116 (H) 98 - 107 mmol/L    Bicarbonate 19 (L) 21 - 32 mmol/L    Anion Gap 11 10 - 20 mmol/L    Urea Nitrogen 20 6 - 23 mg/dL    Creatinine 0.59 0.50 - 1.05 mg/dL    eGFR >90 >60 mL/min/1.73m*2    Calcium 6.1 (L) 8.6 - 10.3 mg/dL    Albumin 3.0 (L) 3.4 - 5.0 g/dL    Alkaline Phosphatase 36 33 - 136 U/L    Total Protein 5.0 (L) 6.4 - 8.2 g/dL    AST 63 (H) 9 - 39 U/L    Bilirubin, Total 1.0 0.0 - 1.2 mg/dL    ALT 25 7 - 45 U/L   Troponin I, High Sensitivity   Result Value Ref Range    Troponin I, High Sensitivity 51 (HH) 0 - 13 ng/L   Urinalysis with Reflex Microscopic   Result Value Ref Range    Color, Urine Kanchan (N) Straw, Yellow    Appearance, Urine Cloudy (N) Clear    Specific Gravity, Urine 1.024 1.005 - 1.035    pH, Urine 5.0 5.0, 5.5, 6.0, 6.5, 7.0, 7.5, 8.0    Protein, Urine 100 (2+) (N) NEGATIVE mg/dL    Glucose, Urine NEGATIVE NEGATIVE mg/dL    Blood, Urine LARGE (3+) (A) NEGATIVE     Ketones, Urine 5 (TRACE) (A) NEGATIVE mg/dL    Bilirubin, Urine NEGATIVE NEGATIVE    Urobilinogen, Urine <2.0 <2.0 mg/dL    Nitrite, Urine NEGATIVE NEGATIVE    Leukocyte Esterase, Urine LARGE (3+) (A) NEGATIVE   Microscopic Only, Urine   Result Value Ref Range    WBC, Urine >50 (A) 1-5, NONE /HPF    RBC, Urine >20 (A) NONE, 1-2, 3-5 /HPF    Bacteria, Urine 3+ (A) NONE SEEN /HPF    Mucus, Urine 1+ Reference range not established. /LPF   Troponin I, High Sensitivity   Result Value Ref Range    Troponin I, High Sensitivity 77 (HH) 0 - 13 ng/L   Troponin I, High Sensitivity   Result Value Ref Range    Troponin I, High Sensitivity 92 (HH) 0 - 13 ng/L   CBC   Result Value Ref Range    WBC 10.7 4.4 - 11.3 x10*3/uL    nRBC 0.0 0.0 - 0.0 /100 WBCs    RBC 4.66 4.00 - 5.20 x10*6/uL    Hemoglobin 13.9 12.0 - 16.0 g/dL    Hematocrit 41.8 36.0 - 46.0 %    MCV 90 80 - 100 fL    MCH 29.8 26.0 - 34.0 pg    MCHC 33.3 32.0 - 36.0 g/dL    RDW 17.0 (H) 11.5 - 14.5 %    Platelets 212 150 - 450 x10*3/uL   Basic metabolic panel   Result Value Ref Range    Glucose 94 74 - 99 mg/dL    Sodium 141 136 - 145 mmol/L    Potassium 4.0 3.5 - 5.3 mmol/L    Chloride 107 98 - 107 mmol/L    Bicarbonate 26 21 - 32 mmol/L    Anion Gap 12 10 - 20 mmol/L    Urea Nitrogen 24 (H) 6 - 23 mg/dL    Creatinine 0.87 0.50 - 1.05 mg/dL    eGFR 68 >60 mL/min/1.73m*2    Calcium 8.8 8.6 - 10.3 mg/dL   Troponin I, High Sensitivity   Result Value Ref Range    Troponin I, High Sensitivity 40 (H) 0 - 13 ng/L        CT head wo IV contrast   Final Result   No acute intracranial hemorrhage or mass effect.        Extensive white matter changes and parenchymal volume loss.        Right mastoid air cell effusion.        No acute fracture or traumatic subluxation of the cervical spine.        Significant multilevel degenerative changes including multilevel   anterolisthesis/retrolisthesis. No critical canal stenosis identified.        Age indeterminate sclerotic lesion the dens  process. Clinical   correlation recommended.        MACRO:   None.        Signed by: Shilpa Yeung 11/8/2023 6:17 PM   Dictation workstation:   [id number]      CT cervical spine wo IV contrast   Final Result   No acute intracranial hemorrhage or mass effect.        Extensive white matter changes and parenchymal volume loss.        Right mastoid air cell effusion.        No acute fracture or traumatic subluxation of the cervical spine.        Significant multilevel degenerative changes including multilevel   anterolisthesis/retrolisthesis. No critical canal stenosis identified.        Age indeterminate sclerotic lesion the dens process. Clinical   correlation recommended.        MACRO:   None.        Signed by: Shilpa Yeung 11/8/2023 6:17 PM   Dictation workstation:   [id number]      XR chest 1 view   Final Result   Left axillary surgical clips.        Mild eventration of the right diaphragm.        Remainder of the exam was negative.        Signed by: Richie Rivera 11/8/2023 1:06 PM   Dictation workstation:   [id number]          Scheduled medications  atorvastatin, 10 mg, oral, Nightly  cefTRIAXone, 1 g, intravenous, q24h  cholecalciferol, 1,000 Units, oral, Daily  donepezil, 10 mg, oral, Nightly  enoxaparin, 40 mg, subcutaneous, q24h  melatonin, 3 mg, oral, Daily  polyethylene glycol, 17 g, oral, Daily  QUEtiapine, 25 mg, oral, Nightly      Continuous medications  sodium chloride 0.9%, 75 mL/hr, Last Rate: 75 mL/hr (11/08/23 2242)      PRN medications  PRN medications: acetaminophen, acetaminophen, ondansetron ODT **OR** ondansetron         Assessment/Plan                  Principal Problem:    Acute metabolic encephalopathy    # Acute metabolic encephalopathy  # UTI  -continue ceftriaxone  -follow up on urine culture     # HLD  - continue statin     # Dementia  - continue Aricept, seroquel     # Elevated troponin  -troponins trended down     # Code status  - uncertain, could not find in paperwork from  [facility]              Josselin Barboza MD

## 2023-11-09 NOTE — CONSULTS
History Of Present Illness:    Gabby Espino is a 78 y.o. female  with a past medical history of HLD, Dementia, OA, presents from nursing home for c/o Acute Mental status changes. Patient found to have a UTI receiving IV antibiotic therapy. Initial EKG reveals sinus rhythm heart rate 74 (poor quality imaging), chest x-ray reveals no acute cardiopulmonary processes, urinalysis reveals a large amount of leukocytes, troponin 51, 77, 92, Initial wbc 16.4.  Cardiology consulted for further evaluation of elevated troponins.      [place] [facility] patient is alert and oriented to self only.  She denies any complaints of chest pain, dyspnea or palpitations.  Patient currently resides in a nursing home and was of note brought [place] [facility] [place] of acute mental status changes.  She is currently in no apparent distress and has no supplemental oxygen requirements.    All other systems reviewed and negative unless as mentioned in HPI, D/t patient's AMS, obtained history from medical chart.    Past Medical History:  Hyperlipidemia  Dementia  Osteoarthritis    Past Surgical History:  She has a past surgical history that includes Cholecystectomy (06/29/2018); Other surgical history (06/29/2018); Knee arthroscopy w/ debridement (06/29/2018); Other surgical history (06/29/2018); and Cataract extraction (06/29/2018).      Social History:  Reviewed    Family History:  Reviewed, not pertinent to presenting problem     Allergies:  Hydrocodone-acetaminophen    ROS:  10 point review of systems including (Constitutional, Eyes, ENMT, Respiratory, Cardiac, Gastrointestinal, Neurological, Psychiatric, and Hematologic) was performed and is otherwise negative.    Objective Data:  Last Recorded Vitals:  Vitals:    11/08/23 2100 11/08/23 2213 11/09/23 0418 11/09/23 0807   BP:   113/66 113/70   BP Location:   Right arm    Patient Position:   Lying Lying   Pulse:   56 59   Resp:   17 18   Temp:   36.5 °C (97.7 °F)  "36.2 °C (97.2 °F)   TempSrc:   Temporal Oral   SpO2:   97% 98%   Weight: 69.8 kg (153 lb 12.8 oz)      Height:  1.6 m (5' 3\")       Medical Gas Therapy: None (Room air)  O2 Delivery Method: Nasal cannula  Weight  Av.9 kg (151 lb 14.4 oz)  Min: 68 kg (150 lb)  Max: 69.8 kg (153 lb 12.8 oz)      LABS:  CMP:  Results from last 7 days   Lab Units 23  0525 23  1216   SODIUM mmol/L 141 142   POTASSIUM mmol/L 4.0 3.7   CHLORIDE mmol/L 107 116*   CO2 mmol/L 26 19*   ANION GAP mmol/L 12 11   BUN mg/dL 24* 20   CREATININE mg/dL 0.87 0.59   EGFR mL/min/1.73m*2 68 >90   ALBUMIN g/dL  --  3.0*   ALT U/L  --  25   AST U/L  --  63*   BILIRUBIN TOTAL mg/dL  --  1.0     CBC:  Results from last 7 days   Lab Units 23  0525 23  1216   WBC AUTO x10*3/uL 10.7 16.4*   HEMOGLOBIN g/dL 13.9 16.3*   HEMATOCRIT % 41.8 51.0*   PLATELETS AUTO x10*3/uL 212 264   MCV fL 90 91     COAG:     ABO: No results found for: \"ABO\"  HEME/ENDO:     CARDIAC:   Results from last 7 days   Lab Units 23  0823 23  1741 23  1530 23  1216   TROPHS ng/L 40* 92* 77* 51*             Last I/O:    Intake/Output Summary (Last 24 hours) at 2023 1135  Last data filed at 2023 1000  Gross per 24 hour   Intake 290 ml   Output --   Net 290 ml     Net IO Since Admission: 290 mL [23 1135]      Imaging Results:  CT head wo IV contrast    Result Date: 2023  Interpreted By:  Shilpa Yeung, STUDY: CT HEAD WO IV CONTRAST; CT CERVICAL SPINE WO IV CONTRAST;  2023 6:06 pm   INDICATION: found on floor; Signs/Symptoms:found on ground.   COMPARISON: None.   ACCESSION NUMBER(S): [id number]; [id number]   ORDERING CLINICIAN: LISA BROWNLEE   TECHNIQUE: Noncontrast CT images of head. Axial noncontrast CT images of the cervical spine with coronal and sagittal reconstructed images.   FINDINGS: BRAIN PARENCHYMA: Extensive nonspecific white matter changes and parenchymal volume loss. No mass effect or midline shift.  "  HEMORRHAGE: No acute intracranial hemorrhage. VENTRICLES and EXTRA-AXIAL SPACES: The ventricles, sulci and basal cisterns enlarged, concordant with parenchymal volume loss. EXTRACRANIAL SOFT TISSUES: Within normal limits. PARANASAL SINUSES/MASTOIDS: Partial opacification of the right mastoid air cells. Paranasal sinuses are patent. Possible cerumen in the external auditory canals. CALVARIUM: No depressed skull fracture. No destructive osseous lesion.   OTHER FINDINGS: None.   CERVICAL SPINE:   ALIGNMENT: Anterolisthesis of C2 on C3 and C3 on C4. Retrolisthesis of C5 on C6. Facet joint alignment maintained. VERTEBRAE: Sclerotic focus in the left dens body which is age-indeterminate. Bones are demineralized which Schmorl's nodes. Chronic appearing vertebral body height loss at C5. No acute fracture or compression deformity otherwise identified. SPINAL CANAL: No critical spinal canal stenosis. PREVERTEBRAL SOFT TISSUES: No prevertebral soft tissue swelling. LUNG APICES: Imaged portion of the lung apices are within normal limits.   OTHER FINDINGS: None.       No acute intracranial hemorrhage or mass effect.   Extensive white matter changes and parenchymal volume loss.   Right mastoid air cell effusion.   No acute fracture or traumatic subluxation of the cervical spine.   Significant multilevel degenerative changes including multilevel anterolisthesis/retrolisthesis. No critical canal stenosis identified.   Age indeterminate sclerotic lesion the dens process. Clinical correlation recommended.   MACRO: None.   Signed by: Shilpa Yeung 11/8/2023 6:17 PM Dictation workstation:   [id number]    CT cervical spine wo IV contrast    Result Date: 11/8/2023  Interpreted By:  Shilpa Yeung, STUDY: CT HEAD WO IV CONTRAST; CT CERVICAL SPINE WO IV CONTRAST;  11/8/2023 6:06 pm   INDICATION: found on floor; Signs/Symptoms:found on ground.   COMPARISON: None.   ACCESSION NUMBER(S): [id number]; [id number]   ORDERING CLINICIAN:  LISA BROWNLEE   TECHNIQUE: Noncontrast CT images of head. Axial noncontrast CT images of the cervical spine with coronal and sagittal reconstructed images.   FINDINGS: BRAIN PARENCHYMA: Extensive nonspecific white matter changes and parenchymal volume loss. No mass effect or midline shift.   HEMORRHAGE: No acute intracranial hemorrhage. VENTRICLES and EXTRA-AXIAL SPACES: The ventricles, sulci and basal cisterns enlarged, concordant with parenchymal volume loss. EXTRACRANIAL SOFT TISSUES: Within normal limits. PARANASAL SINUSES/MASTOIDS: Partial opacification of the right mastoid air cells. Paranasal sinuses are patent. Possible cerumen in the external auditory canals. CALVARIUM: No depressed skull fracture. No destructive osseous lesion.   OTHER FINDINGS: None.   CERVICAL SPINE:   ALIGNMENT: Anterolisthesis of C2 on C3 and C3 on C4. Retrolisthesis of C5 on C6. Facet joint alignment maintained. VERTEBRAE: Sclerotic focus in the left dens body which is age-indeterminate. Bones are demineralized which Schmorl's nodes. Chronic appearing vertebral body height loss at C5. No acute fracture or compression deformity otherwise identified. SPINAL CANAL: No critical spinal canal stenosis. PREVERTEBRAL SOFT TISSUES: No prevertebral soft tissue swelling. LUNG APICES: Imaged portion of the lung apices are within normal limits.   OTHER FINDINGS: None.       No acute intracranial hemorrhage or mass effect.   Extensive white matter changes and parenchymal volume loss.   Right mastoid air cell effusion.   No acute fracture or traumatic subluxation of the cervical spine.   Significant multilevel degenerative changes including multilevel anterolisthesis/retrolisthesis. No critical canal stenosis identified.   Age indeterminate sclerotic lesion the dens process. Clinical correlation recommended.   MACRO: None.   Signed by: Shilpa Yeung 11/8/2023 6:17 PM Dictation workstation:   [id number]    XR chest 1 view    Result Date:  11/8/2023  Interpreted By:  Richie Rivera, STUDY: XR CHEST 1 VIEW;  11/8/2023 12:28 pm   INDICATION: .   COMPARISON: None.   ACCESSION NUMBER(S): [id number]   ORDERING CLINICIAN: LISA BROWNLEE   TECHNIQUE: Single AP portable view of the chest was obtained.   FINDINGS: MEDIASTINUM/ LUNGS/ DIPAK: No cardiomegaly, vascular congestion, or pleural effusion. Mild eventration of the right diaphragm. No abnormal opacity in either lung worrisome for tumor or pneumonia. No pneumothorax. No tracheal deviation. No abnormal hilar fullness or gross mass on either side. Left axillary surgical clips.   BONES: No lytic or blastic destructive bone lesion.   UPPER ABDOMEN: Grossly intact.       Left axillary surgical clips.   Mild eventration of the right diaphragm.   Remainder of the exam was negative.   Signed by: Richie Rivera 11/8/2023 1:06 PM Dictation workstation:   [id number]      Inpatient Medications:  Scheduled medications   Medication Dose Route Frequency    atorvastatin  10 mg oral Nightly    cefTRIAXone  1 g intravenous q24h    cholecalciferol  1,000 Units oral Daily    donepezil  10 mg oral Nightly    enoxaparin  40 mg subcutaneous q24h    melatonin  3 mg oral Daily    polyethylene glycol  17 g oral Daily    QUEtiapine  25 mg oral Nightly     PRN medications   Medication    acetaminophen    acetaminophen    ondansetron ODT    Or    ondansetron     Continuous Medications   Medication Dose Last Rate    sodium chloride 0.9%  75 mL/hr 75 mL/hr (11/09/23 1055)       Inpatient Medications:  Scheduled medications   Medication Dose Route Frequency    atorvastatin  10 mg oral Nightly    cefTRIAXone  1 g intravenous q24h    cholecalciferol  1,000 Units oral Daily    donepezil  10 mg oral Nightly    enoxaparin  40 mg subcutaneous q24h    melatonin  3 mg oral Daily    polyethylene glycol  17 g oral Daily    QUEtiapine  25 mg oral Nightly     PRN medications   Medication    acetaminophen    acetaminophen    ondansetron ODT    Or     ondansetron     Continuous Medications   Medication Dose Last Rate    sodium chloride 0.9%  75 mL/hr 75 mL/hr (11/09/23 1055)     Outpatient Medications:  No current outpatient medications    Physical Exam:  General: Patient awake and alert, disoriented to place, situation and time> known history of dementia, NAD.  HEENT:  Pupils equal and reactive.  Normocephalic.  Moist mucosa.    Neck:  No thyromegaly.  Normal Jugular Venous Pressure.  Cardiovascular:  Regular rate and rhythm.  Normal S1 and S2.  Pulmonary:  Clear to auscultation bilaterally.  Abdomen:  Soft. Non-tender.   Non-distended.  Positive bowel sounds.  Lower Extremities:  2+ pedal pulses. No LE edema.  Skin: Skin warm and dry, normal skin turgor.   Psychiatric: Normal affect.     Assessment/Plan   Gabby Espino is a 78 y.o. female  with a past medical history of HLD, Dementia, OA, presents from nursing home for c/o Acute Mental status changes. Patient found to have a UTI receiving IV antibiotic therapy. Initial EKG reveals sinus rhythm heart rate 74 (poor quality imaging), chest x-ray reveals no acute cardiopulmonary processes, urinalysis reveals a large amount of leukocytes, troponin 51, 77, 92, Initial wbc 16.4.  Cardiology consulted for further evaluation of elevated troponins.      I reviewed EKG, poor quality, sinus rhythm heart rate 74  Currently not on telemetry  I reviewed all labs and imaging reports    Abnormal troponins in the setting of UTI/ Infection.  No complaints of chest pain.  Non-MI troponin elevation, core measures do not apply.    Hyperlipidemia.  Continue statin    Recommendations:  No ACS noted  Supportive care management  Continue to treat UTI infection/> defer management to primary  No further orders from a cardiac perspective  Please call with questions    Code Status:  Full Code          Anneliese Leon, APRN-CNP    Inpatient consult to Cardiology  Consult performed by: Everardo Daniels,   Consult ordered by: Isaura Ramos,  MD  Reason for consult: Elevated trop      STAFF ADDENDUM:  This is a shared visit. I have reviewed the Advanced Practice Provider's encounter note, approve the Advanced Practice Provider's documentation, and provide the following additional information from my personal encounter.     78-year-old with baseline dementia admitted with altered mental status and found to have UTI.  On exam she is very confused and rambling.  She denies any chest pain or shortness of breath.  We are consulted regarding elevated troponin with peak of 92.  No ACS symptoms and likely secondary to infection.  EKG without ischemic changes.  No further cardiovascular work-up recommended at this time.  Signing off.    Everardo Daniels DO

## 2023-11-09 NOTE — NURSING NOTE
Notified Dr. Barboza that patient has Lortab allergy, PRN tylenol orders are triggering a possible allergy alert, no new orders at this time

## 2023-11-09 NOTE — H&P
History Of Present Illness  Gabby Espino is a 78 y.o. female with a PMH of HLD, OA, dementia, presenting with altered mentals status from [facility]. Assume the AMS is acute, since note from Dr Tillman yesterday states that her mental status is at baseline.     Pt denies any symptoms.   Wears an incontinence brief, had pasty foul smelling stool in the ED.   Work up shows a WBC ct of 164  Remainder of labs except troponin unremarkable.     HS Trop: 51 ->77 ->92  Ms Kenzie denies Chest pain, SOB.     UA showed lg blood, lg LE, 3+ bacteria       Past Medical History  Past Medical History:   Diagnosis Date    Osteoarthritis of knee, unspecified 2018    Osteoarthritis of knee    Personal history of malignant neoplasm of breast 2018    History of malignant neoplasm of left breast    Primary osteoarthritis, unspecified shoulder 2018    Osteoarthritis of shoulder       Surgical History  Past Surgical History:   Procedure Laterality Date    CATARACT EXTRACTION  2018    Cataract Surgery    CHOLECYSTECTOMY  2018    Cholecystectomy    KNEE ARTHROSCOPY W/ DEBRIDEMENT  2018    Arthroscopy Knee Left    OTHER SURGICAL HISTORY  2018    Oral Surgery Tooth Extraction Rex Tooth    OTHER SURGICAL HISTORY  2018    Left Breast Partial Mastectomy        Social History  She reports that she has never smoked. She has never been exposed to tobacco smoke. She has never used smokeless tobacco. No history on file for alcohol use and drug use.    Family History  No family history on file.     Allergies  Patient has no allergy information on record.    Review of Systems   Constitutional: Negative.         Pleasantly confused, denies any symptoms. Thinks she is in [place], avoids answering question about the year, stating it depends on what calendar is used, since [employer] people have a different calendar.   Knows her .    HENT: Negative.     Eyes: Negative.    Respiratory: Negative.    "  Cardiovascular: Negative.    Gastrointestinal: Negative.    Genitourinary: Negative.    Musculoskeletal: Negative.    Skin: Negative.    Allergic/Immunologic: Negative.    Neurological: Negative.    Hematological: Negative.    Psychiatric/Behavioral: Negative.          Physical Exam  Vitals reviewed.   Constitutional:       Appearance: Normal appearance. She is obese.   HENT:      Head: Normocephalic and atraumatic.      Mouth/Throat:      Mouth: Mucous membranes are moist.   Eyes:      Extraocular Movements: Extraocular movements intact.      Conjunctiva/sclera: Conjunctivae normal.      Pupils: Pupils are equal, round, and reactive to light.   Cardiovascular:      Rate and Rhythm: Normal rate and regular rhythm.      Pulses: Normal pulses.      Heart sounds: Normal heart sounds.   Pulmonary:      Effort: Pulmonary effort is normal.      Breath sounds: Normal breath sounds.   Abdominal:      General: Abdomen is flat. Bowel sounds are normal.      Palpations: Abdomen is soft.   Musculoskeletal:         General: Normal range of motion.   Skin:     General: Skin is warm and dry.      Comments: No decubitus ulcers   Neurological:      General: No focal deficit present.      Mental Status: She is alert.      Comments: Oriented to person only   Psychiatric:         Mood and Affect: Mood normal.         Behavior: Behavior normal.         Thought Content: Thought content normal.         Judgment: Judgment normal.          Last Recorded Vitals  Blood pressure 126/51, pulse 63, temperature 36.6 °C (97.8 °F), temperature source Temporal, resp. rate 18, height 1.6 m (5' 3\"), weight 69.8 kg (153 lb 12.8 oz), SpO2 100 %.    Relevant Results      Results for orders placed or performed during the hospital encounter of 11/08/23 (from the past 24 hour(s))   CBC and Auto Differential   Result Value Ref Range    WBC 16.4 (H) 4.4 - 11.3 x10*3/uL    nRBC 0.0 0.0 - 0.0 /100 WBCs    RBC 5.63 (H) 4.00 - 5.20 x10*6/uL    Hemoglobin 16.3 " (H) 12.0 - 16.0 g/dL    Hematocrit 51.0 (H) 36.0 - 46.0 %    MCV 91 80 - 100 fL    MCH 29.0 26.0 - 34.0 pg    MCHC 32.0 32.0 - 36.0 g/dL    RDW 17.5 (H) 11.5 - 14.5 %    Platelets 264 150 - 450 x10*3/uL    Neutrophils % 88.0 40.0 - 80.0 %    Immature Granulocytes %, Automated 0.6 0.0 - 0.9 %    Lymphocytes % 4.0 13.0 - 44.0 %    Monocytes % 7.2 2.0 - 10.0 %    Eosinophils % 0.0 0.0 - 6.0 %    Basophils % 0.2 0.0 - 2.0 %    Neutrophils Absolute 14.41 (H) 1.60 - 5.50 x10*3/uL    Immature Granulocytes Absolute, Automated 0.09 0.00 - 0.50 x10*3/uL    Lymphocytes Absolute 0.65 (L) 0.80 - 3.00 x10*3/uL    Monocytes Absolute 1.17 (H) 0.05 - 0.80 x10*3/uL    Eosinophils Absolute 0.00 0.00 - 0.40 x10*3/uL    Basophils Absolute 0.03 0.00 - 0.10 x10*3/uL   Comprehensive metabolic panel   Result Value Ref Range    Glucose 99 74 - 99 mg/dL    Sodium 142 136 - 145 mmol/L    Potassium 3.7 3.5 - 5.3 mmol/L    Chloride 116 (H) 98 - 107 mmol/L    Bicarbonate 19 (L) 21 - 32 mmol/L    Anion Gap 11 10 - 20 mmol/L    Urea Nitrogen 20 6 - 23 mg/dL    Creatinine 0.59 0.50 - 1.05 mg/dL    eGFR >90 >60 mL/min/1.73m*2    Calcium 6.1 (L) 8.6 - 10.3 mg/dL    Albumin 3.0 (L) 3.4 - 5.0 g/dL    Alkaline Phosphatase 36 33 - 136 U/L    Total Protein 5.0 (L) 6.4 - 8.2 g/dL    AST 63 (H) 9 - 39 U/L    Bilirubin, Total 1.0 0.0 - 1.2 mg/dL    ALT 25 7 - 45 U/L   Troponin I, High Sensitivity   Result Value Ref Range    Troponin I, High Sensitivity 51 (HH) 0 - 13 ng/L   Urinalysis with Reflex Microscopic   Result Value Ref Range    Color, Urine Kanchan (N) Straw, Yellow    Appearance, Urine Cloudy (N) Clear    Specific Gravity, Urine 1.024 1.005 - 1.035    pH, Urine 5.0 5.0, 5.5, 6.0, 6.5, 7.0, 7.5, 8.0    Protein, Urine 100 (2+) (N) NEGATIVE mg/dL    Glucose, Urine NEGATIVE NEGATIVE mg/dL    Blood, Urine LARGE (3+) (A) NEGATIVE    Ketones, Urine 5 (TRACE) (A) NEGATIVE mg/dL    Bilirubin, Urine NEGATIVE NEGATIVE    Urobilinogen, Urine <2.0 <2.0 mg/dL     Nitrite, Urine NEGATIVE NEGATIVE    Leukocyte Esterase, Urine LARGE (3+) (A) NEGATIVE   Microscopic Only, Urine   Result Value Ref Range    WBC, Urine >50 (A) 1-5, NONE /HPF    RBC, Urine >20 (A) NONE, 1-2, 3-5 /HPF    Bacteria, Urine 3+ (A) NONE SEEN /HPF    Mucus, Urine 1+ Reference range not established. /LPF   Troponin I, High Sensitivity   Result Value Ref Range    Troponin I, High Sensitivity 77 (HH) 0 - 13 ng/L   Troponin I, High Sensitivity   Result Value Ref Range    Troponin I, High Sensitivity 92 (HH) 0 - 13 ng/L     CT head wo IV contrast    Result Date: 11/8/2023  Interpreted By:  Shilpa Yeung, STUDY: CT HEAD WO IV CONTRAST; CT CERVICAL SPINE WO IV CONTRAST;  11/8/2023 6:06 pm   INDICATION: found on floor; Signs/Symptoms:found on ground.   COMPARISON: None.   ACCESSION NUMBER(S): [id number]; [id number]   ORDERING CLINICIAN: LISA BROWNLEE   TECHNIQUE: Noncontrast CT images of head. Axial noncontrast CT images of the cervical spine with coronal and sagittal reconstructed images.   FINDINGS: BRAIN PARENCHYMA: Extensive nonspecific white matter changes and parenchymal volume loss. No mass effect or midline shift.   HEMORRHAGE: No acute intracranial hemorrhage. VENTRICLES and EXTRA-AXIAL SPACES: The ventricles, sulci and basal cisterns enlarged, concordant with parenchymal volume loss. EXTRACRANIAL SOFT TISSUES: Within normal limits. PARANASAL SINUSES/MASTOIDS: Partial opacification of the right mastoid air cells. Paranasal sinuses are patent. Possible cerumen in the external auditory canals. CALVARIUM: No depressed skull fracture. No destructive osseous lesion.   OTHER FINDINGS: None.   CERVICAL SPINE:   ALIGNMENT: Anterolisthesis of C2 on C3 and C3 on C4. Retrolisthesis of C5 on C6. Facet joint alignment maintained. VERTEBRAE: Sclerotic focus in the left dens body which is age-indeterminate. Bones are demineralized which Schmorl's nodes. Chronic appearing vertebral body height loss at C5. No acute  fracture or compression deformity otherwise identified. SPINAL CANAL: No critical spinal canal stenosis. PREVERTEBRAL SOFT TISSUES: No prevertebral soft tissue swelling. LUNG APICES: Imaged portion of the lung apices are within normal limits.   OTHER FINDINGS: None.       No acute intracranial hemorrhage or mass effect.   Extensive white matter changes and parenchymal volume loss.   Right mastoid air cell effusion.   No acute fracture or traumatic subluxation of the cervical spine.   Significant multilevel degenerative changes including multilevel anterolisthesis/retrolisthesis. No critical canal stenosis identified.   Age indeterminate sclerotic lesion the dens process. Clinical correlation recommended.   MACRO: None.   Signed by: Shilpa Yeung 11/8/2023 6:17 PM Dictation workstation:   [id number]    CT cervical spine wo IV contrast    Result Date: 11/8/2023  Interpreted By:  Shilpa Yeung, STUDY: CT HEAD WO IV CONTRAST; CT CERVICAL SPINE WO IV CONTRAST;  11/8/2023 6:06 pm   INDICATION: found on floor; Signs/Symptoms:found on ground.   COMPARISON: None.   ACCESSION NUMBER(S): [id number]; [id number]   ORDERING CLINICIAN: LISA BROWNLEE   TECHNIQUE: Noncontrast CT images of head. Axial noncontrast CT images of the cervical spine with coronal and sagittal reconstructed images.   FINDINGS: BRAIN PARENCHYMA: Extensive nonspecific white matter changes and parenchymal volume loss. No mass effect or midline shift.   HEMORRHAGE: No acute intracranial hemorrhage. VENTRICLES and EXTRA-AXIAL SPACES: The ventricles, sulci and basal cisterns enlarged, concordant with parenchymal volume loss. EXTRACRANIAL SOFT TISSUES: Within normal limits. PARANASAL SINUSES/MASTOIDS: Partial opacification of the right mastoid air cells. Paranasal sinuses are patent. Possible cerumen in the external auditory canals. CALVARIUM: No depressed skull fracture. No destructive osseous lesion.   OTHER FINDINGS: None.   CERVICAL SPINE:   ALIGNMENT:  Anterolisthesis of C2 on C3 and C3 on C4. Retrolisthesis of C5 on C6. Facet joint alignment maintained. VERTEBRAE: Sclerotic focus in the left dens body which is age-indeterminate. Bones are demineralized which Schmorl's nodes. Chronic appearing vertebral body height loss at C5. No acute fracture or compression deformity otherwise identified. SPINAL CANAL: No critical spinal canal stenosis. PREVERTEBRAL SOFT TISSUES: No prevertebral soft tissue swelling. LUNG APICES: Imaged portion of the lung apices are within normal limits.   OTHER FINDINGS: None.       No acute intracranial hemorrhage or mass effect.   Extensive white matter changes and parenchymal volume loss.   Right mastoid air cell effusion.   No acute fracture or traumatic subluxation of the cervical spine.   Significant multilevel degenerative changes including multilevel anterolisthesis/retrolisthesis. No critical canal stenosis identified.   Age indeterminate sclerotic lesion the dens process. Clinical correlation recommended.   MACRO: None.   Signed by: Shilpa Yeung 11/8/2023 6:17 PM Dictation workstation:   [id number]    XR chest 1 view    Result Date: 11/8/2023  Interpreted By:  Richie Rivera, STUDY: XR CHEST 1 VIEW;  11/8/2023 12:28 pm   INDICATION: .   COMPARISON: None.   ACCESSION NUMBER(S): [id number]   ORDERING CLINICIAN: LISA BROWNLEE   TECHNIQUE: Single AP portable view of the chest was obtained.   FINDINGS: MEDIASTINUM/ LUNGS/ DIPAK: No cardiomegaly, vascular congestion, or pleural effusion. Mild eventration of the right diaphragm. No abnormal opacity in either lung worrisome for tumor or pneumonia. No pneumothorax. No tracheal deviation. No abnormal hilar fullness or gross mass on either side. Left axillary surgical clips.   BONES: No lytic or blastic destructive bone lesion.   UPPER ABDOMEN: Grossly intact.       Left axillary surgical clips.   Mild eventration of the right diaphragm.   Remainder of the exam was negative.   Signed by:  Richie Miguel 11/8/2023 1:06 PM Dictation workstation:   [id number]        Assessment/Plan   Principal Problem:    Acute metabolic encephalopathy  - likely due to UTI  - should see improvement with antibiotics  - uncertain what her baseline truly is    HLD  - continue statin    Dementia  - continue Aricept, seroquel    Elevated troponin  - continue to trend  - denies CP  - cardiology consult    Code status  - uncertain, could not find in paperwork from SNF.        I spent 60 minutes in the professional and overall care of this patient.      Isaura Ramos MD

## 2023-11-09 NOTE — PROGRESS NOTES
11/09/23 1036   Discharge Planning   Living Arrangements Alone   Support Systems Family members  (Assisted Living staff)   Assistance Needed Relies on some care from others   Type of Residence Assisted living   Number of Stairs to Enter Residence 0   Number of Stairs Within Residence 0   Care Facility Name [facility] [phone]   Home or Post Acute Services Post acute facilities (Rehab/SNF/etc)   Type of Post Acute Facility Services Assisted living   Patient expects to be discharged to: [facility]   Does the patient need discharge transport arranged? Yes   RoundTrip coordination needed? Yes   Housing Stability   In the last 12 months, was there a time when you were not able to pay the mortgage or rent on time? N   In the last 12 months, how many places have you lived? 1   Transportation Needs   In the past 12 months, has lack of transportation kept you from medical appointments or from getting medications? no   In the past 12 months, has lack of transportation kept you from meetings, work, or from getting things needed for daily living? No     Attempted to meet with patient at bedside, unavailable- with nursing staff each time, this TCC did speak to Taya ZAMZAM @ [facility] [phone], if no physical deficits at dc, patient may return, normally patient is somewhat confused, but able to get to dining room for meals, does own personal care but does have the option to have stand by assist or hands on assist if needed when she returns, [facility] manages meds, patient walks around facility but is not a flight/elopement risk, able to get to her own apartment, able to lock/unlock apartment door, able to carry on conversation even with some forgetfulness, H&P faxed to Taya WYMAN [phone],  Taya also states patient will need transport set up @ [place] back to [facility]. Pcp correct in EMR, meds to beds added as pharmacy at this time.     Met with patient at bedside, still confused, states she lives in  [place], will return to [place] at [place] but says she has been to [facility] assisted living and they make sure she eats when she is there.     Attempted to call family, brother was listed but his number is disconnected, attempted to call both numbers listed for sister Yumiko, first one mailbox full, 2nd number says vm has not been set up.

## 2023-11-10 ENCOUNTER — PHARMACY VISIT (OUTPATIENT)
Dept: PHARMACY | Facility: CLINIC | Age: 78
End: 2023-11-10
Payer: COMMERCIAL

## 2023-11-10 VITALS
RESPIRATION RATE: 16 BRPM | DIASTOLIC BLOOD PRESSURE: 74 MMHG | TEMPERATURE: 98.3 F | SYSTOLIC BLOOD PRESSURE: 116 MMHG | HEIGHT: 63 IN | WEIGHT: 153.8 LBS | HEART RATE: 60 BPM | OXYGEN SATURATION: 99 % | BODY MASS INDEX: 27.25 KG/M2

## 2023-11-10 LAB
ANION GAP SERPL CALC-SCNC: 19 MMOL/L (ref 10–20)
BUN SERPL-MCNC: 21 MG/DL (ref 6–23)
CALCIUM SERPL-MCNC: 8.7 MG/DL (ref 8.6–10.3)
CHLORIDE SERPL-SCNC: 109 MMOL/L (ref 98–107)
CO2 SERPL-SCNC: 16 MMOL/L (ref 21–32)
CREAT SERPL-MCNC: 0.83 MG/DL (ref 0.5–1.05)
ERYTHROCYTE [DISTWIDTH] IN BLOOD BY AUTOMATED COUNT: 17.2 % (ref 11.5–14.5)
GFR SERPL CREATININE-BSD FRML MDRD: 72 ML/MIN/1.73M*2
GLUCOSE SERPL-MCNC: 81 MG/DL (ref 74–99)
HCT VFR BLD AUTO: 51.1 % (ref 36–46)
HGB BLD-MCNC: 15.6 G/DL (ref 12–16)
MCH RBC QN AUTO: 29.1 PG (ref 26–34)
MCHC RBC AUTO-ENTMCNC: 30.5 G/DL (ref 32–36)
MCV RBC AUTO: 95 FL (ref 80–100)
NRBC BLD-RTO: 0 /100 WBCS (ref 0–0)
PLATELET # BLD AUTO: 146 X10*3/UL (ref 150–450)
POTASSIUM SERPL-SCNC: 4.1 MMOL/L (ref 3.5–5.3)
RBC # BLD AUTO: 5.37 X10*6/UL (ref 4–5.2)
SODIUM SERPL-SCNC: 140 MMOL/L (ref 136–145)
WBC # BLD AUTO: 7.8 X10*3/UL (ref 4.4–11.3)

## 2023-11-10 PROCEDURE — 36415 COLL VENOUS BLD VENIPUNCTURE: CPT | Performed by: INTERNAL MEDICINE

## 2023-11-10 PROCEDURE — 1100000001 HC PRIVATE ROOM DAILY

## 2023-11-10 PROCEDURE — G0378 HOSPITAL OBSERVATION PER HR: HCPCS

## 2023-11-10 PROCEDURE — 80048 BASIC METABOLIC PNL TOTAL CA: CPT | Performed by: INTERNAL MEDICINE

## 2023-11-10 PROCEDURE — 99239 HOSP IP/OBS DSCHRG MGMT >30: CPT | Performed by: INTERNAL MEDICINE

## 2023-11-10 PROCEDURE — RXMED WILLOW AMBULATORY MEDICATION CHARGE

## 2023-11-10 PROCEDURE — 2500000004 HC RX 250 GENERAL PHARMACY W/ HCPCS (ALT 636 FOR OP/ED): Performed by: HOSPITALIST

## 2023-11-10 PROCEDURE — 94760 N-INVAS EAR/PLS OXIMETRY 1: CPT

## 2023-11-10 PROCEDURE — 2500000001 HC RX 250 WO HCPCS SELF ADMINISTERED DRUGS (ALT 637 FOR MEDICARE OP): Performed by: HOSPITALIST

## 2023-11-10 PROCEDURE — 96372 THER/PROPH/DIAG INJ SC/IM: CPT | Performed by: HOSPITALIST

## 2023-11-10 PROCEDURE — 85027 COMPLETE CBC AUTOMATED: CPT | Performed by: INTERNAL MEDICINE

## 2023-11-10 RX ORDER — CEFDINIR 300 MG/1
300 CAPSULE ORAL 2 TIMES DAILY
Qty: 6 CAPSULE | Refills: 0 | Status: SHIPPED | OUTPATIENT
Start: 2023-11-10 | End: 2023-11-13

## 2023-11-10 RX ORDER — ATORVASTATIN CALCIUM 10 MG/1
10 TABLET, FILM COATED ORAL NIGHTLY
Qty: 30 TABLET | Refills: 0 | Status: SHIPPED | OUTPATIENT
Start: 2023-11-10 | End: 2023-12-10

## 2023-11-10 RX ADMIN — DONEPEZIL HYDROCHLORIDE 10 MG: 5 TABLET ORAL at 20:30

## 2023-11-10 RX ADMIN — Medication 3 MG: at 19:35

## 2023-11-10 RX ADMIN — ATORVASTATIN CALCIUM 10 MG: 10 TABLET, FILM COATED ORAL at 20:30

## 2023-11-10 RX ADMIN — Medication 1000 UNITS: at 08:40

## 2023-11-10 RX ADMIN — QUETIAPINE FUMARATE 25 MG: 25 TABLET ORAL at 20:30

## 2023-11-10 RX ADMIN — ENOXAPARIN SODIUM 40 MG: 40 INJECTION SUBCUTANEOUS at 08:40

## 2023-11-10 ASSESSMENT — PAIN SCALES - GENERAL: PAINLEVEL_OUTOF10: 0 - NO PAIN

## 2023-11-10 ASSESSMENT — PAIN - FUNCTIONAL ASSESSMENT: PAIN_FUNCTIONAL_ASSESSMENT: 0-10

## 2023-11-10 NOTE — DISCHARGE SUMMARY
Discharge Diagnosis  Acute metabolic encephalopathy    Issues Requiring Follow-Up  PCP follow up in 2 weeks    Discharge Meds     Your medication list        START taking these medications        Instructions Last Dose Given Next Dose Due   atorvastatin 10 mg tablet  Commonly known as: Lipitor      Take 1 tablet (10 mg) by mouth once daily at bedtime.       cefdinir 300 mg capsule  Commonly known as: Omnicef      Take 1 capsule (300 mg) by mouth 2 times a day for 3 days.                 Where to Get Your Medications        These medications were sent to [facility]  [address], [facility] [address]      Hours: 8 AM to 6 PM Mon-Fri, 9 AM to 1 PM Saturday Phone: [phone]   atorvastatin 10 mg tablet  cefdinir 300 mg capsule         Test Results Pending At Discharge  Pending Labs       No current pending labs.            Hospital Course     # Acute metabolic encephalopathy  # UTI  -continue ceftriaxone  -urine cx contaminated  -discharge on cefdinir     # HLD  - continue statin     # Dementia  - continue Aricept, seroquel     # Elevated troponin  -troponins trended down    Patient will be discharged back to assisted living today.  Advised her to follow-up with her PCP as outpatient in 1 to 2 weeks.    Pertinent Physical Exam At Time of Discharge  Physical Exam    Constitutional: No acute distress, awake, alert  Head/Neck: Neck supple  Respiratory/Thorax: Lungs are Clear to auscultation  Cardiovascular: Regular, rate and rhythm,  2+ equal pulses of the extremities, normal S 1and S 2  Gastrointestinal: Nondistended, soft, non-tender, no rebound tenderness or guarding  Extremities: No edema. No calf tenderness.  Neurological: Awake and alert. No focal neurological deficits  Psychological: Appropriate mood and behavior    Outpatient Follow-Up  No future appointments.      Josselin Barboza MD

## 2023-11-10 NOTE — CARE PLAN
Dr Cintron plans to dc patient later today, she will need transport set up back to [facility], I have a call out to their DON- Taya [phone], patient is somewhat confused at baseline, they are aware she has a UTI, no PT/OT needs at [facility]. She is up walking the halls with the mobility aide. per Taya WYMAN there were no barriers to patient returning previously. Left a verbal message with the  at the AL, she will give the message to the DON/ADON that patient is returning later today. Bedside RN and MD aware of above.    1300- spoke to nurse from [facility], she is aware that patient will return later today. Transport to be arranged by [facility] staff.

## 2023-11-10 NOTE — NURSING NOTE

## 2023-11-11 PROCEDURE — G0378 HOSPITAL OBSERVATION PER HR: HCPCS

## 2023-11-13 NOTE — ED PROVIDER NOTES
HPI   Chief Complaint   Patient presents with    Altered Mental Status     Pt to ED from [facility] via EMS for c/o altered mental status/fall. Per EMS pt was found on floor covered in feces this morning by facility staff with altered mental status. Pt is currently alert to self only but is normally A&Ox4 & ambulates independently. LKW 1700 yesterday. Pt voices no complaints at this time.        HPI: This is a 78-year-old female presenting from her skilled nursing facility for altered mental status after being found down.  As per EMS the patient was found on the ground in her room by staff at her nursing facility.  She is reportedly normally alert and oriented x4 but is alert and oriented x2 at the moment.  She has no complaints but is confused appearing.      Limitations to history: None  Independent Historians: EMS  External Records Reviewed: HIE, outpatient notes, inpatient notes  ------------------------------------------------------------------------------------------------------------------------------------------  ROS: a ten point review of systems was performed and was negative except as per HPI.  ------------------------------------------------------------------------------------------------------------------------------------------  PMH / PSH: as per HPI, otherwise reviewed in EMR  MEDS: as per HPI, otherwise reviewed in EMR  ALLERGIES: as per HPI, otherwise reviewed in EMR  SocH:  as per HPI, otherwise reviewed in EMR  FH:  as per HPI, otherwise reviewed in EMR  ------------------------------------------------------------------------------------------------------------------------------------------  Physical Exam:  VS: As documented in the triage note and EMR flowsheet from this visit was reviewed  General: Confused appearing. No acute distress.   Eyes:  Extraocular movements grossly intact. No scleral icterus. No discharge  HEENT:  Normocephalic.  Atraumatic  Neck: Moves neck  freely. No gross masses  CV: Regular rhythm. No murmurs, rubs or gallops   Resp: Clear to auscultation bilaterally. No respiratory distress.    GI: Soft, no masses, nontender. No rebound tenderness or guarding  MSK: Symmetric muscle bulk. No deformities. No lower extremity edema.    Skin: Warm, dry, intact.   Neuro: No focal deficits.  A&O x2  Psych: Confused but alert and conversive  ------------------------------------------------------------------------------------------------------------------------------------------  Hospital Course / Medical Decision Making:  Independent Interpretations: CT head / c spine, chest xray  EKG as interpreted by me: Irregularly irregular rhythm with an approximate ventricular rate of 74 bpm with no bundle branch block and no signs of acute ischemia    MDM: This is a 78-year-old female presenting for altered mental status after being found on the ground.  She is normocephalic and atraumatic.  CT shows no acute intracranial process.  No cervical spine fracture noted.  Troponin is mildly elevated.  EKG is nonischemic.  Urinalysis is positive for UTI.  I do not feel that her troponin is high enough to represent NSTEMI.  She was given Rocephin in the ED.  I feel her presentation is consistent with metabolic encephalopathy.  Patient was admitted to the medicine service for further management    Discussion of Management with Other Providers:   I discussed the patient/results with: Emergency medicine team    Final diagnosis and disposition as below.    Results for orders placed or performed during the hospital encounter of 11/08/23  -Urine culture:   Specimen: Clean Catch/Voided; Urine       Result                      Value             Ref Range           Urine Culture                                                 Multiple organisms present, probable contamination. Repeat culture if clinically indicated. (A)  -CBC and Auto Differential:        Result                      Value              Ref Range           WBC                         16.4 (H)          4.4 - 11.3 x*       nRBC                        0.0               0.0 - 0.0 /1*       RBC                         5.63 (H)          4.00 - 5.20 *       Hemoglobin                  16.3 (H)          12.0 - 16.0 *       Hematocrit                  51.0 (H)          36.0 - 46.0 %       MCV                         91                80 - 100 fL         MCH                         29.0              26.0 - 34.0 *       MCHC                        32.0              32.0 - 36.0 *       RDW                         17.5 (H)          11.5 - 14.5 %       Platelets                   264               150 - 450 x1*       Neutrophils %               88.0              40.0 - 80.0 %       Immature Granulocytes *     0.6               0.0 - 0.9 %         Lymphocytes %               4.0               13.0 - 44.0 %       Monocytes %                 7.2               2.0 - 10.0 %        Eosinophils %               0.0               0.0 - 6.0 %         Basophils %                 0.2               0.0 - 2.0 %         Neutrophils Absolute        14.41 (H)         1.60 - 5.50 *       Immature Granulocytes *     0.09              0.00 - 0.50 *       Lymphocytes Absolute        0.65 (L)          0.80 - 3.00 *       Monocytes Absolute          1.17 (H)          0.05 - 0.80 *       Eosinophils Absolute        0.00              0.00 - 0.40 *       Basophils Absolute          0.03              0.00 - 0.10 *  -Comprehensive metabolic panel:        Result                      Value             Ref Range           Glucose                     99                74 - 99 mg/dL       Sodium                      142               136 - 145 mm*       Potassium                   3.7               3.5 - 5.3 mm*       Chloride                    116 (H)           98 - 107 mmo*       Bicarbonate                 19 (L)            21 - 32 mmol*       Anion Gap                   11                 10 - 20 mmol*       Urea Nitrogen               20                6 - 23 mg/dL        Creatinine                  0.59              0.50 - 1.05 *       eGFR                        >90               >60 mL/min/1*       Calcium                     6.1 (L)           8.6 - 10.3 m*       Albumin                     3.0 (L)           3.4 - 5.0 g/*       Alkaline Phosphatase        36                33 - 136 U/L        Total Protein               5.0 (L)           6.4 - 8.2 g/*       AST                         63 (H)            9 - 39 U/L          Bilirubin, Total            1.0               0.0 - 1.2 mg*       ALT                         25                7 - 45 U/L     -Urinalysis with Reflex Microscopic:        Result                      Value             Ref Range           Color, Urine                [place] (N)         Straw, Yellow       Appearance, Urine           Cloudy (N)        Clear               Specific Gravity, Urine     1.024             1.005 - 1.035       pH, Urine                   5.0               5.0, 5.5, 6.*       Protein, Urine              100 (2+) (N)      NEGATIVE mg/*       Glucose, Urine              NEGATIVE          NEGATIVE mg/*       Blood, Urine                                  NEGATIVE        LARGE (3+) (A)       Ketones, Urine              5 (TRACE) (A)     NEGATIVE mg/*       Bilirubin, Urine            NEGATIVE          NEGATIVE            Urobilinogen, Urine         <2.0              <2.0 mg/dL          Nitrite, Urine              NEGATIVE          NEGATIVE            Leukocyte Esterase, Ur*                       NEGATIVE        LARGE (3+) (A)  -Troponin I, High Sensitivity:        Result                      Value             Ref Range           Troponin I, High Sensi*     51 (HH)           0 - 13 ng/L    -Microscopic Only, Urine:        Result                      Value             Ref Range           WBC, Urine                  >50 (A)           1-5, NONE /H*       RBC, Urine                   >20 (A)           NONE, 1-2, 3*       Bacteria, Urine             3+ (A)            NONE SEEN /H*       Mucus, Urine                1+                Reference ra*  -Troponin I, High Sensitivity:        Result                      Value             Ref Range           Troponin I, High Sensi*     77 (HH)           0 - 13 ng/L    -Troponin I, High Sensitivity:        Result                      Value             Ref Range           Troponin I, High Sensi*     92 (HH)           0 - 13 ng/L    -CBC:        Result                      Value             Ref Range           WBC                         10.7              4.4 - 11.3 x*       nRBC                        0.0               0.0 - 0.0 /1*       RBC                         4.66              4.00 - 5.20 *       Hemoglobin                  13.9              12.0 - 16.0 *       Hematocrit                  41.8              36.0 - 46.0 %       MCV                         90                80 - 100 fL         MCH                         29.8              26.0 - 34.0 *       MCHC                        33.3              32.0 - 36.0 *       RDW                         17.0 (H)          11.5 - 14.5 %       Platelets                   212               150 - 450 x1*  -Basic metabolic panel:        Result                      Value             Ref Range           Glucose                     94                74 - 99 mg/dL       Sodium                      141               136 - 145 mm*       Potassium                   4.0               3.5 - 5.3 mm*       Chloride                    107               98 - 107 mmo*       Bicarbonate                 26                21 - 32 mmol*       Anion Gap                   12                10 - 20 mmol*       Urea Nitrogen               24 (H)            6 - 23 mg/dL        Creatinine                  0.87              0.50 - 1.05 *       eGFR                        68                >60 mL/min/1*       Calcium                      8.8               8.6 - 10.3 m*  -Troponin I, High Sensitivity:        Result                      Value             Ref Range           Troponin I, High Sensi*     40 (H)            0 - 13 ng/L    -CBC:        Result                      Value             Ref Range           WBC                         7.8               4.4 - 11.3 x*       nRBC                        0.0               0.0 - 0.0 /1*       RBC                         5.37 (H)          4.00 - 5.20 *       Hemoglobin                  15.6              12.0 - 16.0 *       Hematocrit                  51.1 (H)          36.0 - 46.0 %       MCV                         95                80 - 100 fL         MCH                         29.1              26.0 - 34.0 *       MCHC                        30.5 (L)          32.0 - 36.0 *       RDW                         17.2 (H)          11.5 - 14.5 %       Platelets                   146 (L)           150 - 450 x1*  -Basic Metabolic Panel:        Result                      Value             Ref Range           Glucose                     81                74 - 99 mg/dL       Sodium                      140               136 - 145 mm*       Potassium                   4.1               3.5 - 5.3 mm*       Chloride                    109 (H)           98 - 107 mmo*       Bicarbonate                 16 (L)            21 - 32 mmol*       Anion Gap                   19                10 - 20 mmol*       Urea Nitrogen               21                6 - 23 mg/dL        Creatinine                  0.83              0.50 - 1.05 *       eGFR                        72                >60 mL/min/1*       Calcium                     8.7               8.6 - 10.3 m*  CT head wo IV contrast   Final Result    No acute intracranial hemorrhage or mass effect.          Extensive white matter changes and parenchymal volume loss.          Right mastoid air cell effusion.          No acute fracture or traumatic subluxation  of the cervical spine.          Significant multilevel degenerative changes including multilevel    anterolisthesis/retrolisthesis. No critical canal stenosis identified.          Age indeterminate sclerotic lesion the dens process. Clinical    correlation recommended.          MACRO:    None.          Signed by: Shilpa Yeung 11/8/2023 6:17 PM    Dictation workstation:   [id number]     CT cervical spine wo IV contrast   Final Result    No acute intracranial hemorrhage or mass effect.          Extensive white matter changes and parenchymal volume loss.          Right mastoid air cell effusion.          No acute fracture or traumatic subluxation of the cervical spine.          Significant multilevel degenerative changes including multilevel    anterolisthesis/retrolisthesis. No critical canal stenosis identified.          Age indeterminate sclerotic lesion the dens process. Clinical    correlation recommended.          MACRO:    None.          Signed by: Shilpa Yeung 11/8/2023 6:17 PM    Dictation workstation:   [id number]     XR chest 1 view   Final Result    Left axillary surgical clips.          Mild eventration of the right diaphragm.          Remainder of the exam was negative.          Signed by: Richie Rivera 11/8/2023 1:06 PM    Dictation workstation:   [id number]                               No data recorded                Patient History   Past Medical History:   Diagnosis Date    Osteoarthritis of knee, unspecified 06/29/2018    Osteoarthritis of knee    Personal history of malignant neoplasm of breast 06/29/2018    History of malignant neoplasm of left breast    Primary osteoarthritis, unspecified shoulder 06/29/2018    Osteoarthritis of shoulder     Past Surgical History:   Procedure Laterality Date    CATARACT EXTRACTION  06/29/2018    Cataract Surgery    CHOLECYSTECTOMY  06/29/2018    Cholecystectomy    KNEE ARTHROSCOPY W/ DEBRIDEMENT  06/29/2018    Arthroscopy Knee Left    OTHER SURGICAL  HISTORY  06/29/2018    Oral Surgery Tooth Extraction [facility]    OTHER SURGICAL HISTORY  06/29/2018    Left Breast Partial Mastectomy     No family history on file.  Social History     Tobacco Use    Smoking status: Never     Passive exposure: Never    Smokeless tobacco: Never   Substance Use Topics    Alcohol use: Not on file    Drug use: Not on file       Physical Exam   ED Triage Vitals   Temp Heart Rate Resp BP   11/08/23 1149 11/08/23 1149 11/08/23 1149 11/08/23 1149   36.1 °C (97 °F) (!) 129 18 (!) 160/103      SpO2 Temp Source Heart Rate Source Patient Position   11/08/23 1149 11/08/23 2018 11/08/23 2018 11/08/23 2018   99 % Temporal Monitor Lying      BP Location FiO2 (%)     11/08/23 2018 --     Right arm        Physical Exam    ED Course & MDM   ED Course as of 11/13/23 1403   Wed Nov 08, 2023   1856 Troponin I, High Sensitivity(!!): 92 [CC]   1859 Troponin I, High Sensitivity(!!): 92 [CC]      ED Course User Index  [CC] Addi Dnaiel DO         Diagnoses as of 11/13/23 1403   Acute metabolic encephalopathy   UTI (urinary tract infection), bacterial       Medical Decision Making      Procedure  Procedures     Addi Daniel DO  11/13/23 1410

## 2023-11-14 ENCOUNTER — NURSING HOME VISIT (OUTPATIENT)
Dept: POST ACUTE CARE | Facility: EXTERNAL LOCATION | Age: 78
End: 2023-11-14
Payer: MEDICARE

## 2023-11-14 DIAGNOSIS — N39.0 URINARY TRACT INFECTION WITHOUT HEMATURIA, SITE UNSPECIFIED: ICD-10-CM

## 2023-11-14 PROCEDURE — 99348 HOME/RES VST EST LOW MDM 30: CPT | Performed by: INTERNAL MEDICINE

## 2023-11-14 NOTE — LETTER
Patient: Gabby Espino  : 1945    Encounter Date: 2023    PROGRESS NOTE    Subjective  Chief complaint: Gabby Espino is a 78 y.o. female who is an assisted living facility patient being seen and evaluated for s\p fall.     HPI:  Patient seen and examined at bedside. Nursing reported that patient had fall, sent to ED for evaluation. Patient returned to facility with UTI diagnosis on ATB therapy. Patient continues ATB with no adverse reactions noted, remains afebrile. No other concerns at this time.       Objective  Vital signs: 122/81,94,94%    Physical Exam  Constitutional:       General: She is not in acute distress.  Eyes:      Extraocular Movements: Extraocular movements intact.   Cardiovascular:      Rate and Rhythm: Regular rhythm.   Pulmonary:      Effort: Pulmonary effort is normal.      Breath sounds: Normal breath sounds.   Abdominal:      General: Bowel sounds are normal.      Palpations: Abdomen is soft.   Musculoskeletal:      Cervical back: Neck supple.      Right lower leg: No edema.      Left lower leg: No edema.   Neurological:      Mental Status: She is alert.   Psychiatric:         Mood and Affect: Mood normal.         Behavior: Behavior is cooperative.         Assessment/Plan  Problem List Items Addressed This Visit       UTI (urinary tract infection)     Continue ATB   Monitor symptoms          Medications, treatments, and labs reviewed  Continue medications and treatments as listed in EMR    Scribe Attestation  IArely Scribe   attest that this documentation has been prepared under the direction and in the presence of Krissy Tillman MD.     Provider Attestation - Scribe documentation  All medical record entries made by the Scribe were at my direction and personally dictated by me. I have reviewed the chart and agree that the record accurately reflects my personal performance of the history, physical exam, discussion and plan.     Krissy Tillman,  MD      Electronically Signed By: Krissy Tillman MD   11/14/23  7:25 PM

## 2023-11-14 NOTE — PROGRESS NOTES
PROGRESS NOTE    Subjective   Chief complaint: Gabby Espino is a 78 y.o. female who is an assisted living facility patient being seen and evaluated for s\p fall.     HPI:  Patient seen and examined at bedside. Nursing reported that patient had fall, sent to ED for evaluation. Patient returned to facility with UTI diagnosis on ATB therapy. Patient continues ATB with no adverse reactions noted, remains afebrile. No other concerns at this time.       Objective   Vital signs: 122/81,94,94%    Physical Exam  Constitutional:       General: She is not in acute distress.  Eyes:      Extraocular Movements: Extraocular movements intact.   Cardiovascular:      Rate and Rhythm: Regular rhythm.   Pulmonary:      Effort: Pulmonary effort is normal.      Breath sounds: Normal breath sounds.   Abdominal:      General: Bowel sounds are normal.      Palpations: Abdomen is soft.   Musculoskeletal:      Cervical back: Neck supple.      Right lower leg: No edema.      Left lower leg: No edema.   Neurological:      Mental Status: She is alert.   Psychiatric:         Mood and Affect: Mood normal.         Behavior: Behavior is cooperative.         Assessment/Plan   Problem List Items Addressed This Visit       UTI (urinary tract infection)     Continue ATB   Monitor symptoms          Medications, treatments, and labs reviewed  Continue medications and treatments as listed in EMR    Scribe Attestation  IArely Scribe   attest that this documentation has been prepared under the direction and in the presence of Krissy Tillman MD.     Provider Attestation - Scribe documentation  All medical record entries made by the Scribe were at my direction and personally dictated by me. I have reviewed the chart and agree that the record accurately reflects my personal performance of the history, physical exam, discussion and plan.     Krissy Tillman MD

## 2023-12-05 ENCOUNTER — NURSING HOME VISIT (OUTPATIENT)
Dept: POST ACUTE CARE | Facility: EXTERNAL LOCATION | Age: 78
End: 2023-12-05
Payer: MEDICARE

## 2023-12-05 DIAGNOSIS — F03.B0 MODERATE DEMENTIA, UNSPECIFIED DEMENTIA TYPE, UNSPECIFIED WHETHER BEHAVIORAL, PSYCHOTIC, OR MOOD DISTURBANCE OR ANXIETY (MULTI): ICD-10-CM

## 2023-12-05 DIAGNOSIS — E78.5 HYPERLIPIDEMIA, UNSPECIFIED HYPERLIPIDEMIA TYPE: ICD-10-CM

## 2023-12-05 DIAGNOSIS — M19.90 OSTEOARTHRITIS, UNSPECIFIED OSTEOARTHRITIS TYPE, UNSPECIFIED SITE: ICD-10-CM

## 2023-12-05 PROCEDURE — 99348 HOME/RES VST EST LOW MDM 30: CPT | Performed by: INTERNAL MEDICINE

## 2023-12-05 NOTE — PROGRESS NOTES
PROGRESS NOTE    Subjective   Chief complaint: Gabby Espino is a 78 y.o. female who is an assisted living facility patient being seen and evaluated for monthly general medical care and follow-up.    HPI:  Patient presents for general medical care and f/u.  Patient seen and examined at bedside.  No issues per nursing.  Patient has no acute complaints. Patient has a diagnosis of hyperlipidemia.   denies any muscle aches.  Patient is a diagnosis of osteoarthritis.  Denies any pain or discomfort.  Patient has dementia and requires assist with ADLs. Patient had fall last week and was sent to ED for assessment, she returned has is back at baseline with no injuries.  No acute distress.      Objective   Vital signs: 95/60,62,99%    Physical Exam  Constitutional:       General: She is not in acute distress.  Eyes:      Extraocular Movements: Extraocular movements intact.   Cardiovascular:      Rate and Rhythm: Regular rhythm.   Pulmonary:      Effort: Pulmonary effort is normal.      Breath sounds: Normal breath sounds.   Abdominal:      General: Bowel sounds are normal.      Palpations: Abdomen is soft.   Musculoskeletal:      Cervical back: Neck supple.      Right lower leg: No edema.      Left lower leg: No edema.   Neurological:      Mental Status: She is alert.   Psychiatric:         Mood and Affect: Mood normal.         Behavior: Behavior is cooperative.         Assessment/Plan   Problem List Items Addressed This Visit       Dementia (CMS/[facility])     Mentation at baseline.    Continue Aricept.    Monitor for changes in behavior.         HLD (hyperlipidemia)     Continue atorvastatin.  Monitor labs routinely         OA (osteoarthritis)     Pain is controlled.    Monitor for changes.          Medications, treatments, and labs reviewed  Continue medications and treatments as listed in EMR    Scribe Attestation  I, Darby Rizzo   attest that this documentation has been prepared under the direction and in the  presence of Krissy Tillman MD.     Provider Attestation - Scribe documentation  All medical record entries made by the Scribe were at my direction and personally dictated by me. I have reviewed the chart and agree that the record accurately reflects my personal performance of the history, physical exam, discussion and plan.     Krissy Tillman MD

## 2023-12-05 NOTE — LETTER
Patient: Gabby Espino  : 1945    Encounter Date: 2023    PROGRESS NOTE    Subjective  Chief complaint: Gabby Espino is a 78 y.o. female who is an assisted living facility patient being seen and evaluated for monthly general medical care and follow-up.    HPI:  Patient presents for general medical care and f/u.  Patient seen and examined at bedside.  No issues per nursing.  Patient has no acute complaints. Patient has a diagnosis of hyperlipidemia.   denies any muscle aches.  Patient is a diagnosis of osteoarthritis.  Denies any pain or discomfort.  Patient has dementia and requires assist with ADLs. Patient had fall last week and was sent to ED for assessment, she returned has is back at baseline with no injuries.  No acute distress.      Objective  Vital signs: 95/60,62,99%    Physical Exam  Constitutional:       General: She is not in acute distress.  Eyes:      Extraocular Movements: Extraocular movements intact.   Cardiovascular:      Rate and Rhythm: Regular rhythm.   Pulmonary:      Effort: Pulmonary effort is normal.      Breath sounds: Normal breath sounds.   Abdominal:      General: Bowel sounds are normal.      Palpations: Abdomen is soft.   Musculoskeletal:      Cervical back: Neck supple.      Right lower leg: No edema.      Left lower leg: No edema.   Neurological:      Mental Status: She is alert.   Psychiatric:         Mood and Affect: Mood normal.         Behavior: Behavior is cooperative.         Assessment/Plan  Problem List Items Addressed This Visit       Dementia (CMS/[facility])     Mentation at baseline.    Continue Aricept.    Monitor for changes in behavior.         HLD (hyperlipidemia)     Continue atorvastatin.  Monitor labs routinely         OA (osteoarthritis)     Pain is controlled.    Monitor for changes.          Medications, treatments, and labs reviewed  Continue medications and treatments as listed in EMR    Scribe Attestation  I, Darby Rizzo   attest that  this documentation has been prepared under the direction and in the presence of Krissy Tillman MD.     Provider Attestation - Scribe documentation  All medical record entries made by the Scribe were at my direction and personally dictated by me. I have reviewed the chart and agree that the record accurately reflects my personal performance of the history, physical exam, discussion and plan.     Krissy Tillamn MD      Electronically Signed By: Krissy Tillman MD   12/5/23  6:26 PM

## 2024-01-09 ENCOUNTER — NURSING HOME VISIT (OUTPATIENT)
Dept: POST ACUTE CARE | Facility: EXTERNAL LOCATION | Age: 79
End: 2024-01-09
Payer: MEDICARE

## 2024-01-09 DIAGNOSIS — M19.90 OSTEOARTHRITIS, UNSPECIFIED OSTEOARTHRITIS TYPE, UNSPECIFIED SITE: ICD-10-CM

## 2024-01-09 DIAGNOSIS — E78.5 HYPERLIPIDEMIA, UNSPECIFIED HYPERLIPIDEMIA TYPE: ICD-10-CM

## 2024-01-09 DIAGNOSIS — F03.B0 MODERATE DEMENTIA, UNSPECIFIED DEMENTIA TYPE, UNSPECIFIED WHETHER BEHAVIORAL, PSYCHOTIC, OR MOOD DISTURBANCE OR ANXIETY (MULTI): ICD-10-CM

## 2024-01-09 PROCEDURE — 99348 HOME/RES VST EST LOW MDM 30: CPT | Performed by: INTERNAL MEDICINE

## 2024-01-09 NOTE — LETTER
Patient: Gabby Espino  : 1945    Encounter Date: 2024    PROGRESS NOTE    Subjective  Chief complaint: Gabby Espino is a 78 y.o. female who is an assisted living facility patient being seen and evaluated for monthly general medical care and follow-up    HPI:  Patient presents for general medical care and f/u.  Patient seen and examined at bedside.  No issues per nursing.  Patient has no acute complaints. Patient has a diagnosis of hyperlipidemia.   denies any muscle aches.  Patient is a diagnosis of osteoarthritis.  Denies any pain or discomfort.  Patient has dementia and requires assist with ADLs.  No acute distress.      Objective  Vital signs: 123\72,61,98%    Physical Exam  Constitutional:       General: She is not in acute distress.  Eyes:      Extraocular Movements: Extraocular movements intact.   Cardiovascular:      Rate and Rhythm: Regular rhythm.   Pulmonary:      Effort: Pulmonary effort is normal.      Breath sounds: Normal breath sounds.   Abdominal:      General: Bowel sounds are normal.      Palpations: Abdomen is soft.   Musculoskeletal:      Cervical back: Neck supple.      Right lower leg: No edema.      Left lower leg: No edema.   Neurological:      Mental Status: She is alert.   Psychiatric:         Mood and Affect: Mood normal.         Behavior: Behavior is cooperative.         Assessment/Plan  Problem List Items Addressed This Visit       Dementia (CMS/[facility])     Mentation at baseline.    Continue Aricept.    Monitor for changes in behavior.         HLD (hyperlipidemia)     Continue atorvastatin.  Monitor labs routinely         OA (osteoarthritis)     Pain is controlled.    Monitor for changes.          Medications, treatments, and labs reviewed  Continue medications and treatments as listed in EMR    Scribe Attestation  DERIAN, Darby Rizzo   attest that this documentation has been prepared under the direction and in the presence of Krissy Tillman MD.     Provider  Attestation - Scribe documentation  All medical record entries made by the Scribe were at my direction and personally dictated by me. I have reviewed the chart and agree that the record accurately reflects my personal performance of the history, physical exam, discussion and plan.     Krissy Tillman MD      Electronically Signed By: Krissy Tillman MD   1/10/24  6:27 PM

## 2024-01-10 NOTE — PROGRESS NOTES
PROGRESS NOTE    Subjective   Chief complaint: Gabby Espino is a 78 y.o. female who is an assisted living facility patient being seen and evaluated for monthly general medical care and follow-up    HPI:  Patient presents for general medical care and f/u.  Patient seen and examined at bedside.  No issues per nursing.  Patient has no acute complaints. Patient has a diagnosis of hyperlipidemia.   denies any muscle aches.  Patient is a diagnosis of osteoarthritis.  Denies any pain or discomfort.  Patient has dementia and requires assist with ADLs.  No acute distress.      Objective   Vital signs: 123\72,61,98%    Physical Exam  Constitutional:       General: She is not in acute distress.  Eyes:      Extraocular Movements: Extraocular movements intact.   Cardiovascular:      Rate and Rhythm: Regular rhythm.   Pulmonary:      Effort: Pulmonary effort is normal.      Breath sounds: Normal breath sounds.   Abdominal:      General: Bowel sounds are normal.      Palpations: Abdomen is soft.   Musculoskeletal:      Cervical back: Neck supple.      Right lower leg: No edema.      Left lower leg: No edema.   Neurological:      Mental Status: She is alert.   Psychiatric:         Mood and Affect: Mood normal.         Behavior: Behavior is cooperative.         Assessment/Plan   Problem List Items Addressed This Visit       Dementia (CMS/[facility])     Mentation at baseline.    Continue Aricept.    Monitor for changes in behavior.         HLD (hyperlipidemia)     Continue atorvastatin.  Monitor labs routinely         OA (osteoarthritis)     Pain is controlled.    Monitor for changes.          Medications, treatments, and labs reviewed  Continue medications and treatments as listed in EMR    Scribe Attestation  I, Darby Rizzo   attest that this documentation has been prepared under the direction and in the presence of Krissy Tillman MD.     Provider Attestation - Scribe documentation  All medical record entries made by the  Scribe were at my direction and personally dictated by me. I have reviewed the chart and agree that the record accurately reflects my personal performance of the history, physical exam, discussion and plan.     Krissy Tillman MD

## 2024-02-06 ENCOUNTER — NURSING HOME VISIT (OUTPATIENT)
Dept: POST ACUTE CARE | Facility: EXTERNAL LOCATION | Age: 79
End: 2024-02-06
Payer: MEDICARE

## 2024-02-06 DIAGNOSIS — M19.90 OSTEOARTHRITIS, UNSPECIFIED OSTEOARTHRITIS TYPE, UNSPECIFIED SITE: ICD-10-CM

## 2024-02-06 DIAGNOSIS — F03.B0 MODERATE DEMENTIA, UNSPECIFIED DEMENTIA TYPE, UNSPECIFIED WHETHER BEHAVIORAL, PSYCHOTIC, OR MOOD DISTURBANCE OR ANXIETY (MULTI): ICD-10-CM

## 2024-02-06 DIAGNOSIS — E78.5 HYPERLIPIDEMIA, UNSPECIFIED HYPERLIPIDEMIA TYPE: ICD-10-CM

## 2024-02-06 PROCEDURE — 99348 HOME/RES VST EST LOW MDM 30: CPT | Performed by: INTERNAL MEDICINE

## 2024-02-06 NOTE — PROGRESS NOTES
PROGRESS NOTE    Subjective   Chief complaint: Gabby Espino is a 78 y.o. female who is an assisted living facility patient being seen and evaluated for monthly general medical care and follow-up    HPI:  Patient presents for general medical care and f/u.  Patient seen and examined at bedside.  No issues per nursing.  Patient has no acute complaints. Patient has a diagnosis of hyperlipidemia.  denies any muscle aches.  Patient is a diagnosis of osteoarthritis.  Denies any pain or discomfort.  Patient has dementia and requires assist with ADLs.  No acute distress.      Objective   Vital signs: 123/72,61,98%    Physical Exam  Constitutional:       General: She is not in acute distress.  Eyes:      Extraocular Movements: Extraocular movements intact.   Cardiovascular:      Rate and Rhythm: Regular rhythm.   Pulmonary:      Effort: Pulmonary effort is normal.      Breath sounds: Normal breath sounds.   Abdominal:      General: Bowel sounds are normal.      Palpations: Abdomen is soft.   Musculoskeletal:      Cervical back: Neck supple.      Right lower leg: No edema.      Left lower leg: No edema.   Neurological:      Mental Status: She is alert.   Psychiatric:         Mood and Affect: Mood normal.         Behavior: Behavior is cooperative.         Assessment/Plan   Problem List Items Addressed This Visit       Dementia (CMS/[facility])     Mentation at baseline.    Continue Aricept.    Monitor for changes in behavior.         HLD (hyperlipidemia)     Continue atorvastatin.  Monitor labs routinely         OA (osteoarthritis)     Pain is controlled.   Monitor for changes.          Medications, treatments, and labs reviewed  Continue medications and treatments as listed in EMR    Scribe Attestation  I, Darby Rizzo   attest that this documentation has been prepared under the direction and in the presence of Krissy Tillman MD.     Provider Attestation - Scribe documentation  All medical record entries made by the  Scribe were at my direction and personally dictated by me. I have reviewed the chart and agree that the record accurately reflects my personal performance of the history, physical exam, discussion and plan.     Krissy Tillman MD

## 2024-02-06 NOTE — LETTER
Patient: Gabby Espino  : 1945    Encounter Date: 2024    PROGRESS NOTE    Subjective  Chief complaint: Gabby Espino is a 78 y.o. female who is an assisted living facility patient being seen and evaluated for monthly general medical care and follow-up    HPI:  Patient presents for general medical care and f/u.  Patient seen and examined at bedside.  No issues per nursing.  Patient has no acute complaints. Patient has a diagnosis of hyperlipidemia.  denies any muscle aches.  Patient is a diagnosis of osteoarthritis.  Denies any pain or discomfort.  Patient has dementia and requires assist with ADLs.  No acute distress.      Objective  Vital signs: 123/72,61,98%    Physical Exam  Constitutional:       General: She is not in acute distress.  Eyes:      Extraocular Movements: Extraocular movements intact.   Cardiovascular:      Rate and Rhythm: Regular rhythm.   Pulmonary:      Effort: Pulmonary effort is normal.      Breath sounds: Normal breath sounds.   Abdominal:      General: Bowel sounds are normal.      Palpations: Abdomen is soft.   Musculoskeletal:      Cervical back: Neck supple.      Right lower leg: No edema.      Left lower leg: No edema.   Neurological:      Mental Status: She is alert.   Psychiatric:         Mood and Affect: Mood normal.         Behavior: Behavior is cooperative.         Assessment/Plan  Problem List Items Addressed This Visit       Dementia (CMS/[facility])     Mentation at baseline.    Continue Aricept.    Monitor for changes in behavior.         HLD (hyperlipidemia)     Continue atorvastatin.  Monitor labs routinely         OA (osteoarthritis)     Pain is controlled.   Monitor for changes.          Medications, treatments, and labs reviewed  Continue medications and treatments as listed in EMR    Scribe Attestation  DERIAN, Darby Rizzo   attest that this documentation has been prepared under the direction and in the presence of Krissy Tillman MD.     Provider  Attestation - Scribe documentation  All medical record entries made by the Scribe were at my direction and personally dictated by me. I have reviewed the chart and agree that the record accurately reflects my personal performance of the history, physical exam, discussion and plan.     Krissy Tillman MD      Electronically Signed By: Krissy Tillman MD   2/6/24  2:30 PM

## 2024-03-05 ENCOUNTER — NURSING HOME VISIT (OUTPATIENT)
Dept: POST ACUTE CARE | Facility: EXTERNAL LOCATION | Age: 79
End: 2024-03-05
Payer: MEDICARE

## 2024-03-05 DIAGNOSIS — E78.5 HYPERLIPIDEMIA, UNSPECIFIED HYPERLIPIDEMIA TYPE: ICD-10-CM

## 2024-03-05 DIAGNOSIS — M19.90 OSTEOARTHRITIS, UNSPECIFIED OSTEOARTHRITIS TYPE, UNSPECIFIED SITE: ICD-10-CM

## 2024-03-05 DIAGNOSIS — F03.B0 MODERATE DEMENTIA, UNSPECIFIED DEMENTIA TYPE, UNSPECIFIED WHETHER BEHAVIORAL, PSYCHOTIC, OR MOOD DISTURBANCE OR ANXIETY (MULTI): Primary | ICD-10-CM

## 2024-03-05 PROCEDURE — 99348 HOME/RES VST EST LOW MDM 30: CPT | Performed by: INTERNAL MEDICINE

## 2024-03-05 NOTE — PROGRESS NOTES
PROGRESS NOTE    Subjective   Chief complaint: Gabby Espino is a 78 y.o. female who is an assisted living facility patient being seen and evaluated for monthly general medical care and follow-up.    HPI:  HPI  Patient presents for general medical care and f/u.  Patient seen and examined at bedside.  No issues per nursing.  Patient has no acute complaints.  Patient has dementia and requires assist with ADLs.  HLD, denies angina, leg cramps or muscle aches.  OA, denies pain or discomfort.  Mentation at baseline, no acute distress.    Objective   Vital signs: 123/72, 97.4, 61, 18, 98%    Physical Exam  Constitutional:       General: She is not in acute distress.  Eyes:      Extraocular Movements: Extraocular movements intact.   Cardiovascular:      Rate and Rhythm: Regular rhythm.   Pulmonary:      Effort: Pulmonary effort is normal.      Breath sounds: Normal breath sounds.   Abdominal:      General: Bowel sounds are normal.      Palpations: Abdomen is soft.   Musculoskeletal:      Cervical back: Neck supple.      Right lower leg: No edema.      Left lower leg: No edema.   Neurological:      Mental Status: She is alert.   Psychiatric:         Mood and Affect: Mood normal.         Behavior: Behavior is cooperative.         Assessment/Plan   Problem List Items Addressed This Visit       Dementia (CMS/[facility]) - Primary     Mentation at baseline.    Continue Aricept.    Monitor for changes in behavior.  Assist with care         HLD (hyperlipidemia)     Continue atorvastatin.  Monitor labs routinely         OA (osteoarthritis)     Pain is controlled.   Monitor for changes.          Medications, treatments, and labs reviewed  Continue medications and treatments as listed in EMR    Scribe Attestation  I, Darby Almaraz   attest that this documentation has been prepared under the direction and in the presence of Krissy Tillman MD    Provider Attestation - Scribe documentation  All medical record entries made by the  Scribe were at my direction and personally dictated by me. I have reviewed the chart and agree that the record accurately reflects my personal performance of the history, physical exam, discussion and plan.   Krissy Tillman MD

## 2024-03-05 NOTE — LETTER
Patient: Gabby Espino  : 1945    Encounter Date: 2024    PROGRESS NOTE    Subjective  Chief complaint: Gabby Espino is a 78 y.o. female who is an assisted living facility patient being seen and evaluated for monthly general medical care and follow-up.    HPI:  HPI  Patient presents for general medical care and f/u.  Patient seen and examined at bedside.  No issues per nursing.  Patient has no acute complaints.  Patient has dementia and requires assist with ADLs.  HLD, denies angina, leg cramps or muscle aches.  OA, denies pain or discomfort.  Mentation at baseline, no acute distress.    Objective  Vital signs: 123/72, 97.4, 61, 18, 98%    Physical Exam  Constitutional:       General: She is not in acute distress.  Eyes:      Extraocular Movements: Extraocular movements intact.   Cardiovascular:      Rate and Rhythm: Regular rhythm.   Pulmonary:      Effort: Pulmonary effort is normal.      Breath sounds: Normal breath sounds.   Abdominal:      General: Bowel sounds are normal.      Palpations: Abdomen is soft.   Musculoskeletal:      Cervical back: Neck supple.      Right lower leg: No edema.      Left lower leg: No edema.   Neurological:      Mental Status: She is alert.   Psychiatric:         Mood and Affect: Mood normal.         Behavior: Behavior is cooperative.         Assessment/Plan  Problem List Items Addressed This Visit       Dementia (CMS/[facility]) - Primary     Mentation at baseline.    Continue Aricept.    Monitor for changes in behavior.  Assist with care         HLD (hyperlipidemia)     Continue atorvastatin.  Monitor labs routinely         OA (osteoarthritis)     Pain is controlled.   Monitor for changes.          Medications, treatments, and labs reviewed  Continue medications and treatments as listed in EMR    Scribe Attestation  I, Darby Almaraz   attest that this documentation has been prepared under the direction and in the presence of Krissy Tillman MD    Provider  Attestation - Scribe documentation  All medical record entries made by the Scribe were at my direction and personally dictated by me. I have reviewed the chart and agree that the record accurately reflects my personal performance of the history, physical exam, discussion and plan.   Krissy Tillman MD      Electronically Signed By: Krissy Tillman MD   3/5/24  4:56 PM

## 2024-04-02 ENCOUNTER — NURSING HOME VISIT (OUTPATIENT)
Dept: POST ACUTE CARE | Facility: EXTERNAL LOCATION | Age: 79
End: 2024-04-02
Payer: MEDICARE

## 2024-04-02 DIAGNOSIS — E78.5 HYPERLIPIDEMIA, UNSPECIFIED HYPERLIPIDEMIA TYPE: ICD-10-CM

## 2024-04-02 DIAGNOSIS — F03.B0 MODERATE DEMENTIA, UNSPECIFIED DEMENTIA TYPE, UNSPECIFIED WHETHER BEHAVIORAL, PSYCHOTIC, OR MOOD DISTURBANCE OR ANXIETY (MULTI): Primary | ICD-10-CM

## 2024-04-02 DIAGNOSIS — M19.90 OSTEOARTHRITIS, UNSPECIFIED OSTEOARTHRITIS TYPE, UNSPECIFIED SITE: ICD-10-CM

## 2024-04-02 PROCEDURE — 99348 HOME/RES VST EST LOW MDM 30: CPT | Performed by: INTERNAL MEDICINE

## 2024-04-02 NOTE — LETTER
Patient: Gabby Espino  : 1945    Encounter Date: 2024    PROGRESS NOTE    Subjective  Chief complaint: Gabby Espino is a 78 y.o. female who is an assisted living facility patient being seen and evaluated for monthly general medical care and follow-up    HPI:  HPI  Patient presents for general medical care and f/u.  Patient seen and examined at bedside.  No issues per nursing.  Patient has no acute complaints.  Patient has dementia and requires assist with ADLs.  HLD denies chest pain, leg cramps, muscle aches.  Osteoarthritis controlled on current pain regimen.  Mentation at baseline, no acute distress.    Objective  Vital signs: 123/72, 97.4, 61, 18, 98%    Physical Exam  Constitutional:       General: She is not in acute distress.  Eyes:      Extraocular Movements: Extraocular movements intact.   Cardiovascular:      Rate and Rhythm: Regular rhythm.   Pulmonary:      Effort: Pulmonary effort is normal.      Breath sounds: Normal breath sounds.   Abdominal:      General: Bowel sounds are normal.      Palpations: Abdomen is soft.   Musculoskeletal:      Cervical back: Neck supple.      Right lower leg: No edema.      Left lower leg: No edema.   Neurological:      Mental Status: She is alert.   Psychiatric:         Mood and Affect: Mood normal.         Behavior: Behavior is cooperative.         Assessment/Plan  Problem List Items Addressed This Visit       Dementia (CMS/HCC) - Primary     Mentation at baseline.    Continue Aricept.    Monitor for changes in behavior.  Assist with care         HLD (hyperlipidemia)     Continue atorvastatin.  Monitor lipids and LFTs routinely         OA (osteoarthritis)     Pain is controlled.   Monitor for changes.          Medications, treatments, and labs reviewed  Continue medications and treatments as listed in EMR    Scribe Attestation  I, Darby Almaraz   attest that this documentation has been prepared under the direction and in the presence of Krissy  CHRISTIANO Tillman MD    Provider Attestation - Scribe documentation  All medical record entries made by the Scribe were at my direction and personally dictated by me. I have reviewed the chart and agree that the record accurately reflects my personal performance of the history, physical exam, discussion and plan.   Krissy Tillman MD      Electronically Signed By: Krissy Tillman MD   4/3/24  3:20 PM

## 2024-04-03 NOTE — PROGRESS NOTES
PROGRESS NOTE    Subjective   Chief complaint: Gabby Espino is a 78 y.o. female who is an assisted living facility patient being seen and evaluated for monthly general medical care and follow-up    HPI:  HPI  Patient presents for general medical care and f/u.  Patient seen and examined at bedside.  No issues per nursing.  Patient has no acute complaints.  Patient has dementia and requires assist with ADLs.  HLD denies chest pain, leg cramps, muscle aches.  Osteoarthritis controlled on current pain regimen.  Mentation at baseline, no acute distress.    Objective   Vital signs: 123/72, 97.4, 61, 18, 98%    Physical Exam  Constitutional:       General: She is not in acute distress.  Eyes:      Extraocular Movements: Extraocular movements intact.   Cardiovascular:      Rate and Rhythm: Regular rhythm.   Pulmonary:      Effort: Pulmonary effort is normal.      Breath sounds: Normal breath sounds.   Abdominal:      General: Bowel sounds are normal.      Palpations: Abdomen is soft.   Musculoskeletal:      Cervical back: Neck supple.      Right lower leg: No edema.      Left lower leg: No edema.   Neurological:      Mental Status: She is alert.   Psychiatric:         Mood and Affect: Mood normal.         Behavior: Behavior is cooperative.         Assessment/Plan   Problem List Items Addressed This Visit       Dementia (CMS/HCC) - Primary     Mentation at baseline.    Continue Aricept.    Monitor for changes in behavior.  Assist with care         HLD (hyperlipidemia)     Continue atorvastatin.  Monitor lipids and LFTs routinely         OA (osteoarthritis)     Pain is controlled.   Monitor for changes.          Medications, treatments, and labs reviewed  Continue medications and treatments as listed in EMR    Scribe Attestation  DERIAN, Darby Almaraz   attest that this documentation has been prepared under the direction and in the presence of Krissy Tillman MD    Provider Attestation - Scribe documentation  All  medical record entries made by the Scribe were at my direction and personally dictated by me. I have reviewed the chart and agree that the record accurately reflects my personal performance of the history, physical exam, discussion and plan.   Krissy Tillman MD

## 2024-05-14 DIAGNOSIS — F03.B0 MODERATE DEMENTIA, UNSPECIFIED DEMENTIA TYPE, UNSPECIFIED WHETHER BEHAVIORAL, PSYCHOTIC, OR MOOD DISTURBANCE OR ANXIETY (MULTI): Primary | ICD-10-CM

## 2024-05-14 RX ORDER — QUETIAPINE FUMARATE 25 MG/1
50 TABLET, FILM COATED ORAL 2 TIMES DAILY
Qty: 120 TABLET | Refills: 11 | Status: SHIPPED | OUTPATIENT
Start: 2024-05-14

## 2024-05-15 ENCOUNTER — NURSING HOME VISIT (OUTPATIENT)
Dept: POST ACUTE CARE | Facility: EXTERNAL LOCATION | Age: 79
End: 2024-05-15
Payer: MEDICARE

## 2024-05-15 DIAGNOSIS — F03.B0 MODERATE DEMENTIA, UNSPECIFIED DEMENTIA TYPE, UNSPECIFIED WHETHER BEHAVIORAL, PSYCHOTIC, OR MOOD DISTURBANCE OR ANXIETY (MULTI): Primary | ICD-10-CM

## 2024-05-15 DIAGNOSIS — G93.41 ACUTE METABOLIC ENCEPHALOPATHY: ICD-10-CM

## 2024-05-15 DIAGNOSIS — M19.90 OSTEOARTHRITIS, UNSPECIFIED OSTEOARTHRITIS TYPE, UNSPECIFIED SITE: ICD-10-CM

## 2024-05-15 DIAGNOSIS — E78.5 HYPERLIPIDEMIA, UNSPECIFIED HYPERLIPIDEMIA TYPE: ICD-10-CM

## 2024-05-15 PROCEDURE — 99348 HOME/RES VST EST LOW MDM 30: CPT | Performed by: INTERNAL MEDICINE

## 2024-05-15 NOTE — PROGRESS NOTES
HISTORY & PHYSICAL    Subjective   Chief complaint: Gabby Espino is a 78 y.o. female who is being seen and evaluated for multiple medical problems.  Patient presents for readmission.    HPI:  HPI  Patient presents for readmission to nursing facility following hospitalization for dementia, agitation.  Patient had been more confused, wandering.  Patient was admitted for further evaluation management.  Patient's medications were adjusted with improvement in behaviors.  Patient was hemodynamically stable to discharge back to nursing facility for further medical management.  Patient was seen and examined at the bedside, appears to be in no acute distress.  Mentation at baseline.  Past Medical History:   Diagnosis Date    Dementia (Multi)     Hyperlipidemia     Neurocognitive disorder with Lewy bodies (CODE) (Multi)     Osteoarthritis of knee, unspecified 06/29/2018    Osteoarthritis of knee    Personal history of malignant neoplasm of breast 06/29/2018    History of malignant neoplasm of left breast    Primary osteoarthritis, unspecified shoulder 06/29/2018    Osteoarthritis of shoulder    Vitamin D deficiency        Past Surgical History:   Procedure Laterality Date    CATARACT EXTRACTION  06/29/2018    Cataract Surgery    CHOLECYSTECTOMY  06/29/2018    Cholecystectomy    KNEE ARTHROSCOPY W/ DEBRIDEMENT  06/29/2018    Arthroscopy Knee Left    OTHER SURGICAL HISTORY  06/29/2018    Oral Surgery Tooth Extraction [place] [facility]    OTHER SURGICAL HISTORY  06/29/2018    Left Breast Partial Mastectomy       Family History   Problem Relation Name Age of Onset    No Known Problems Mother      No Known Problems Father         Social History     Socioeconomic History    Marital status: Single     Spouse name: Not on file    Number of children: Not on file    Years of education: Not on file    Highest education level: Not on file   Occupational History    Not on file   Tobacco Use    Smoking status: Never     Passive exposure:  Never    Smokeless tobacco: Never   Substance and Sexual Activity    Alcohol use: Not on file    Drug use: Not on file    Sexual activity: Not on file   Other Topics Concern    Not on file   Social History Narrative    Not on file     Social Determinants of Health     Financial Resource Strain: Low Risk  (11/8/2023)    Overall Financial Resource Strain (CARDIA)     Difficulty of Paying Living Expenses: Not hard at all   Food Insecurity: Unknown (5/4/2024)    Received from [facility] Vital Sign     Worried About Running Out of Food in the Last Year: Never true     Ran Out of Food in the Last Year: Not on file   Transportation Needs: No Transportation Needs (11/9/2023)    PRAPARE - Transportation     Lack of Transportation (Medical): No     Lack of Transportation (Non-Medical): No   Physical Activity: Not on file   Stress: Not on file   Social Connections: Not on file   Intimate Partner Violence: Not on file   Housing Stability: Low Risk  (11/9/2023)    Housing Stability Vital Sign     Unable to Pay for Housing in the Last Year: No     Number of Places Lived in the Last Year: 1     Unstable Housing in the Last Year: No       Vital signs: 104 54, 97.8, 57, 16, 99%    Objective   Physical Exam  Constitutional:       General: She is not in acute distress.  Eyes:      Extraocular Movements: Extraocular movements intact.   Cardiovascular:      Rate and Rhythm: Regular rhythm.   Pulmonary:      Effort: Pulmonary effort is normal.      Breath sounds: Normal breath sounds.   Abdominal:      General: Bowel sounds are normal.      Palpations: Abdomen is soft.   Musculoskeletal:      Cervical back: Neck supple.      Right lower leg: No edema.      Left lower leg: No edema.   Neurological:      Mental Status: She is alert.   Psychiatric:         Mood and Affect: Mood normal.         Behavior: Behavior is cooperative.         Assessment/Plan   Problem List Items Addressed This Visit       Dementia (Multi) - Primary      Assist with care  Monitor for worsening symptoms.  Donepezil  Benzatropine  Seroquel  Trazodone           HLD (hyperlipidemia)     Continue atorvastatin.  Monitor lipids and LFTs routinely         OA (osteoarthritis)     Pain is controlled.   Monitor for changes.         Acute metabolic encephalopathy     Improved  At baseline            Hospital records reviewed  Medications, treatments, and labs reviewed  Continue medications and treatments as listed in EMR  Discussed with nursing and therapy      Scribe Attestation  I, Darby Almaraz   attest that this documentation has been prepared under the direction and in the presence of Krissy Tillman MD    Provider Attestation - Scribe documentation  All medical record entries made by the Scribe were at my direction and personally dictated by me. I have reviewed the chart and agree that the record accurately reflects my personal performance of the history, physical exam, discussion and plan.   Krissy Tillman MD

## 2024-05-15 NOTE — LETTER
Patient: Gabby Espino  : 1945    Encounter Date: 05/15/2024    HISTORY & PHYSICAL    Subjective  Chief complaint: Gabby Espino is a 78 y.o. female who is being seen and evaluated for multiple medical problems.  Patient presents for readmission.    HPI:  HPI  Patient presents for readmission to nursing facility following hospitalization for dementia, agitation.  Patient had been more confused, wandering.  Patient was admitted for further evaluation management.  Patient's medications were adjusted with improvement in behaviors.  Patient was hemodynamically stable to discharge back to nursing facility for further medical management.  Patient was seen and examined at the bedside, appears to be in no acute distress.  Mentation at baseline.  Past Medical History:   Diagnosis Date   • Dementia (Multi)    • Hyperlipidemia    • Neurocognitive disorder with Lewy bodies (CODE) (Multi)    • Osteoarthritis of knee, unspecified 2018    Osteoarthritis of knee   • Personal history of malignant neoplasm of breast 2018    History of malignant neoplasm of left breast   • Primary osteoarthritis, unspecified shoulder 2018    Osteoarthritis of shoulder   • Vitamin D deficiency        Past Surgical History:   Procedure Laterality Date   • CATARACT EXTRACTION  2018    Cataract Surgery   • CHOLECYSTECTOMY  2018    Cholecystectomy   • KNEE ARTHROSCOPY W/ DEBRIDEMENT  2018    Arthroscopy Knee Left   • OTHER SURGICAL HISTORY  2018    Oral Surgery Tooth Extraction [place] Tooth   • OTHER SURGICAL HISTORY  2018    Left Breast Partial Mastectomy       Family History   Problem Relation Name Age of Onset   • No Known Problems Mother     • No Known Problems Father         Social History     Socioeconomic History   • Marital status: Single     Spouse name: Not on file   • Number of children: Not on file   • Years of education: Not on file   • Highest education level: Not on file   Occupational  History   • Not on file   Tobacco Use   • Smoking status: Never     Passive exposure: Never   • Smokeless tobacco: Never   Substance and Sexual Activity   • Alcohol use: Not on file   • Drug use: Not on file   • Sexual activity: Not on file   Other Topics Concern   • Not on file   Social History Narrative   • Not on file     Social Determinants of Health     Financial Resource Strain: Low Risk  (11/8/2023)    Overall Financial Resource Strain (CARDIA)    • Difficulty of Paying Living Expenses: Not hard at all   Food Insecurity: Unknown (5/4/2024)    Received from [facility] Vital Sign    • Worried About Running Out of Food in the Last Year: Never true    • Ran Out of Food in the Last Year: Not on file   Transportation Needs: No Transportation Needs (11/9/2023)    PRAPARE - Transportation    • Lack of Transportation (Medical): No    • Lack of Transportation (Non-Medical): No   Physical Activity: Not on file   Stress: Not on file   Social Connections: Not on file   Intimate Partner Violence: Not on file   Housing Stability: Low Risk  (11/9/2023)    Housing Stability Vital Sign    • Unable to Pay for Housing in the Last Year: No    • Number of Places Lived in the Last Year: 1    • Unstable Housing in the Last Year: No       Vital signs: 104 54, 97.8, 57, 16, 99%    Objective  Physical Exam  Constitutional:       General: She is not in acute distress.  Eyes:      Extraocular Movements: Extraocular movements intact.   Cardiovascular:      Rate and Rhythm: Regular rhythm.   Pulmonary:      Effort: Pulmonary effort is normal.      Breath sounds: Normal breath sounds.   Abdominal:      General: Bowel sounds are normal.      Palpations: Abdomen is soft.   Musculoskeletal:      Cervical back: Neck supple.      Right lower leg: No edema.      Left lower leg: No edema.   Neurological:      Mental Status: She is alert.   Psychiatric:         Mood and Affect: Mood normal.         Behavior: Behavior is cooperative.          Assessment/Plan  Problem List Items Addressed This Visit       Dementia (Multi) - Primary     Assist with care  Monitor for worsening symptoms.  Donepezil  Benzatropine  Seroquel  Trazodone           HLD (hyperlipidemia)     Continue atorvastatin.  Monitor lipids and LFTs routinely         OA (osteoarthritis)     Pain is controlled.   Monitor for changes.         Acute metabolic encephalopathy     Improved  At baseline            Hospital records reviewed  Medications, treatments, and labs reviewed  Continue medications and treatments as listed in EMR  Discussed with nursing and therapy      Scribe Attestation  IKenia Scribe   attest that this documentation has been prepared under the direction and in the presence of Krissy Tillman MD    Provider Attestation - Scribe documentation  All medical record entries made by the Scribe were at my direction and personally dictated by me. I have reviewed the chart and agree that the record accurately reflects my personal performance of the history, physical exam, discussion and plan.   Krissy Tillman MD      Electronically Signed By: Krissy Tillman MD   5/15/24 12:08 PM

## 2024-05-21 ENCOUNTER — NURSING HOME VISIT (OUTPATIENT)
Dept: POST ACUTE CARE | Facility: EXTERNAL LOCATION | Age: 79
End: 2024-05-21
Payer: MEDICARE

## 2024-05-21 DIAGNOSIS — E78.5 HYPERLIPIDEMIA, UNSPECIFIED HYPERLIPIDEMIA TYPE: ICD-10-CM

## 2024-05-21 DIAGNOSIS — M19.90 OSTEOARTHRITIS, UNSPECIFIED OSTEOARTHRITIS TYPE, UNSPECIFIED SITE: ICD-10-CM

## 2024-05-21 DIAGNOSIS — F03.B0 MODERATE DEMENTIA, UNSPECIFIED DEMENTIA TYPE, UNSPECIFIED WHETHER BEHAVIORAL, PSYCHOTIC, OR MOOD DISTURBANCE OR ANXIETY (MULTI): ICD-10-CM

## 2024-05-21 PROCEDURE — 99348 HOME/RES VST EST LOW MDM 30: CPT | Performed by: INTERNAL MEDICINE

## 2024-05-21 NOTE — PROGRESS NOTES
PROGRESS NOTE    Subjective   Chief complaint: Gabby Espino is a 78 y.o. female who is an assisted living facility patient being seen and evaluated for monthly general medical care and follow-up    HPI:  Patient presents for general medical care and f/u.  Patient seen and examined at bedside.  No issues per nursing.  Patient has no acute complaints. Patient has a diagnosis of hyperlipidemia.  denies any muscle aches.  Patient is a diagnosis of osteoarthritis.  Denies any pain or discomfort.  Patient has dementia and requires assist with ADLs.  No acute distress.      Objective   Vital signs: 104/54,57,995    Physical Exam  Constitutional:       General: She is not in acute distress.  Eyes:      Extraocular Movements: Extraocular movements intact.   Cardiovascular:      Rate and Rhythm: Regular rhythm.   Pulmonary:      Effort: Pulmonary effort is normal.      Breath sounds: Normal breath sounds.   Abdominal:      General: Bowel sounds are normal.      Palpations: Abdomen is soft.   Musculoskeletal:      Cervical back: Neck supple.      Right lower leg: No edema.      Left lower leg: No edema.   Neurological:      Mental Status: She is alert.   Psychiatric:         Mood and Affect: Mood normal.         Behavior: Behavior is cooperative.         Assessment/Plan   Problem List Items Addressed This Visit       Dementia (Multi)     Assist with care  Monitor for worsening symptoms.  Donepezil  Benzatropine  Seroquel  Trazodone           HLD (hyperlipidemia)     Continue atorvastatin.  Monitor lipids and LFTs routinely         OA (osteoarthritis)     Pain is controlled.   Monitor for changes.          Medications, treatments, and labs reviewed  Continue medications and treatments as listed in EMR    Scribe Attestation  I, Darby Rizzo   attest that this documentation has been prepared under the direction and in the presence of Krissy Tillman MD.     Provider Attestation - Scribe documentation  All medical  record entries made by the Scribe were at my direction and personally dictated by me. I have reviewed the chart and agree that the record accurately reflects my personal performance of the history, physical exam, discussion and plan.     Krissy Tillman MD

## 2024-05-21 NOTE — LETTER
Patient: Gabby Espino  : 1945    Encounter Date: 2024    PROGRESS NOTE    Subjective  Chief complaint: Gabby Espino is a 78 y.o. female who is an assisted living facility patient being seen and evaluated for monthly general medical care and follow-up    HPI:  Patient presents for general medical care and f/u.  Patient seen and examined at bedside.  No issues per nursing.  Patient has no acute complaints. Patient has a diagnosis of hyperlipidemia.  denies any muscle aches.  Patient is a diagnosis of osteoarthritis.  Denies any pain or discomfort.  Patient has dementia and requires assist with ADLs.  No acute distress.      Objective  Vital signs: 104/54,57,995    Physical Exam  Constitutional:       General: She is not in acute distress.  Eyes:      Extraocular Movements: Extraocular movements intact.   Cardiovascular:      Rate and Rhythm: Regular rhythm.   Pulmonary:      Effort: Pulmonary effort is normal.      Breath sounds: Normal breath sounds.   Abdominal:      General: Bowel sounds are normal.      Palpations: Abdomen is soft.   Musculoskeletal:      Cervical back: Neck supple.      Right lower leg: No edema.      Left lower leg: No edema.   Neurological:      Mental Status: She is alert.   Psychiatric:         Mood and Affect: Mood normal.         Behavior: Behavior is cooperative.         Assessment/Plan  Problem List Items Addressed This Visit       Dementia (Multi)     Assist with care  Monitor for worsening symptoms.  Donepezil  Benzatropine  Seroquel  Trazodone           HLD (hyperlipidemia)     Continue atorvastatin.  Monitor lipids and LFTs routinely         OA (osteoarthritis)     Pain is controlled.   Monitor for changes.          Medications, treatments, and labs reviewed  Continue medications and treatments as listed in EMR    Scribe Attestation  I, Arely Bañuelos, Darby   attest that this documentation has been prepared under the direction and in the presence of Krissy ALVARADO  MD Primo.     Provider Attestation - Scribe documentation  All medical record entries made by the Scribe were at my direction and personally dictated by me. I have reviewed the chart and agree that the record accurately reflects my personal performance of the history, physical exam, discussion and plan.     Krissy Tillman MD      Electronically Signed By: Krissy Tillman MD   5/21/24  4:34 PM

## 2024-06-20 ENCOUNTER — NURSING HOME VISIT (OUTPATIENT)
Dept: POST ACUTE CARE | Facility: EXTERNAL LOCATION | Age: 79
End: 2024-06-20
Payer: MEDICARE

## 2024-06-20 DIAGNOSIS — E78.5 HYPERLIPIDEMIA, UNSPECIFIED HYPERLIPIDEMIA TYPE: ICD-10-CM

## 2024-06-20 DIAGNOSIS — M19.90 OSTEOARTHRITIS, UNSPECIFIED OSTEOARTHRITIS TYPE, UNSPECIFIED SITE: ICD-10-CM

## 2024-06-20 DIAGNOSIS — F03.B0 MODERATE DEMENTIA, UNSPECIFIED DEMENTIA TYPE, UNSPECIFIED WHETHER BEHAVIORAL, PSYCHOTIC, OR MOOD DISTURBANCE OR ANXIETY (MULTI): Primary | ICD-10-CM

## 2024-06-20 PROCEDURE — 99348 HOME/RES VST EST LOW MDM 30: CPT | Performed by: INTERNAL MEDICINE

## 2024-06-20 NOTE — LETTER
Patient: Gabby Espino  : 1945    Encounter Date: 2024    PROGRESS NOTE    Subjective  Chief complaint: Gabby Espino is a 78 y.o. female who is an assisted living facility patient being seen and evaluated for monthly general medical care and follow-up    HPI:  HPI    An interactive audio and/or video telecommunication system which permits real time communications between the patient (at the originating site) and provider (at a distant site) was utilized to provide this telehealth service after obtaining verbal consent.    Patient presents for general medical care and f/u.  Patient seen and examined at bedside.  No issues per nursing.  Patient has no acute complaints.  Patient has dementia and requires assist with ADLs.  HLD denies angina, leg cramps, muscle aches.  Osteoarthritis, pain controlled with current regime.  Mentation at baseline, no acute distress    Objective  Vital signs: 104/54, 97.8, 57, 16, 99%    Physical Exam  Constitutional:       General: She is not in acute distress.  Eyes:      Extraocular Movements: Extraocular movements intact.   Pulmonary:      Effort: Pulmonary effort is normal.   Musculoskeletal:      Cervical back: Neck supple.   Neurological:      Mental Status: She is alert.   Psychiatric:         Mood and Affect: Mood normal.         Behavior: Behavior is cooperative.         Assessment/Plan  Problem List Items Addressed This Visit       Dementia (Multi) - Primary     Assist with care  Monitor for worsening symptoms.  Donepezil  Benzatropine  Seroquel  Trazodone           HLD (hyperlipidemia)     Continue atorvastatin.  Monitor lipids and LFTs routinely         OA (osteoarthritis)     Pain is controlled.   Monitor for changes.          Medications, treatments, and labs reviewed  Continue medications and treatments as listed in EMR    Scribe Attestation  I, Darby Almaraz   attest that this documentation has been prepared under the direction and in the presence  of Krissy Tillman MD    Provider Attestation - Scribe documentation  All medical record entries made by the Scribe were at my direction and personally dictated by me. I have reviewed the chart and agree that the record accurately reflects my personal performance of the history, physical exam, discussion and plan.   Krissy Tillman MD      Electronically Signed By: Krissy Tillman MD   6/20/24  1:44 PM

## 2024-06-20 NOTE — PROGRESS NOTES
PROGRESS NOTE    Subjective   Chief complaint: Gabby Espino is a 78 y.o. female who is an assisted living facility patient being seen and evaluated for monthly general medical care and follow-up    HPI:  HPI    An interactive audio and/or video telecommunication system which permits real time communications between the patient (at the originating site) and provider (at a distant site) was utilized to provide this telehealth service after obtaining verbal consent.    Patient presents for general medical care and f/u.  Patient seen and examined at bedside.  No issues per nursing.  Patient has no acute complaints.  Patient has dementia and requires assist with ADLs.  HLD denies angina, leg cramps, muscle aches.  Osteoarthritis, pain controlled with current regime.  Mentation at baseline, no acute distress    Objective   Vital signs: 104/54, 97.8, 57, 16, 99%    Physical Exam  Constitutional:       General: She is not in acute distress.  Eyes:      Extraocular Movements: Extraocular movements intact.   Pulmonary:      Effort: Pulmonary effort is normal.   Musculoskeletal:      Cervical back: Neck supple.   Neurological:      Mental Status: She is alert.   Psychiatric:         Mood and Affect: Mood normal.         Behavior: Behavior is cooperative.         Assessment/Plan   Problem List Items Addressed This Visit       Dementia (Multi) - Primary     Assist with care  Monitor for worsening symptoms.  Donepezil  Benzatropine  Seroquel  Trazodone           HLD (hyperlipidemia)     Continue atorvastatin.  Monitor lipids and LFTs routinely         OA (osteoarthritis)     Pain is controlled.   Monitor for changes.          Medications, treatments, and labs reviewed  Continue medications and treatments as listed in EMR    Scribe Attestation  I, Darby Almaraz   attest that this documentation has been prepared under the direction and in the presence of Krissy Tillman MD    Provider Attestation - Scribe  documentation  All medical record entries made by the Scribe were at my direction and personally dictated by me. I have reviewed the chart and agree that the record accurately reflects my personal performance of the history, physical exam, discussion and plan.   Krissy Tillman MD

## 2024-07-22 DIAGNOSIS — G47.00 INSOMNIA, UNSPECIFIED TYPE: ICD-10-CM

## 2024-07-22 RX ORDER — TRAZODONE HYDROCHLORIDE 50 MG/1
50 TABLET ORAL NIGHTLY
COMMUNITY
Start: 2024-05-11 | End: 2024-07-22 | Stop reason: SDUPTHER

## 2024-07-22 RX ORDER — TRAZODONE HYDROCHLORIDE 50 MG/1
50 TABLET ORAL NIGHTLY
Qty: 90 TABLET | Refills: 0 | Status: SHIPPED | OUTPATIENT
Start: 2024-07-22

## 2024-08-20 ENCOUNTER — NURSING HOME VISIT (OUTPATIENT)
Dept: POST ACUTE CARE | Facility: EXTERNAL LOCATION | Age: 79
End: 2024-08-20
Payer: MEDICARE

## 2024-08-20 DIAGNOSIS — E78.5 HYPERLIPIDEMIA, UNSPECIFIED HYPERLIPIDEMIA TYPE: ICD-10-CM

## 2024-08-20 DIAGNOSIS — M19.90 OSTEOARTHRITIS, UNSPECIFIED OSTEOARTHRITIS TYPE, UNSPECIFIED SITE: ICD-10-CM

## 2024-08-20 DIAGNOSIS — F03.B0 MODERATE DEMENTIA, UNSPECIFIED DEMENTIA TYPE, UNSPECIFIED WHETHER BEHAVIORAL, PSYCHOTIC, OR MOOD DISTURBANCE OR ANXIETY (MULTI): Primary | ICD-10-CM

## 2024-08-20 PROCEDURE — 99348 HOME/RES VST EST LOW MDM 30: CPT | Performed by: INTERNAL MEDICINE

## 2024-08-20 NOTE — PROGRESS NOTES
PROGRESS NOTE    Subjective   Chief complaint: Gabby Espino is a 79 y.o. female who is an assisted living facility patient being seen and evaluated for monthly general medical care and follow-up    HPI:  HPI  Patient presents for general medical care and f/u.  Patient seen and examined at bedside.  No issues per nursing.  Patient has no acute complaints.  Patient has dementia and requires assist with ADLs.  OA, denies pain.  Hyperlipidemia, denies muscle aches.  Mentation at baseline, no acute distress.    Objective   Vital signs: 104 54, 97.8, 57, 16, 99%    Physical Exam  Constitutional:       General: She is not in acute distress.  Eyes:      Extraocular Movements: Extraocular movements intact.   Pulmonary:      Effort: Pulmonary effort is normal.   Musculoskeletal:      Cervical back: Neck supple.   Neurological:      Mental Status: She is alert.   Psychiatric:         Mood and Affect: Mood normal.         Behavior: Behavior is cooperative.         Assessment/Plan   Problem List Items Addressed This Visit       Dementia (Multi) - Primary     Assist with care  Monitor for worsening symptoms.  Donepezil  Benzatropine  Seroquel  Trazodone           HLD (hyperlipidemia)     Continue atorvastatin.  Monitor lipids and LFTs routinely         OA (osteoarthritis)     Pain is controlled.   Monitor for changes.          Medications, treatments, and labs reviewed  Continue medications and treatments as listed in EMR    Scribe Attestation  IKenia Scribe   attest that this documentation has been prepared under the direction and in the presence of Krissy Tillman MD    Provider Attestation - Scribe documentation  All medical record entries made by the Scribe were at my direction and personally dictated by me. I have reviewed the chart and agree that the record accurately reflects my personal performance of the history, physical exam, discussion and plan.   Krissy Tillman MD

## 2024-08-20 NOTE — LETTER
Patient: Gabby Espino  : 1945    Encounter Date: 2024    PROGRESS NOTE    Subjective  Chief complaint: Gabby Espino is a 79 y.o. female who is an assisted living facility patient being seen and evaluated for monthly general medical care and follow-up    HPI:  HPI  Patient presents for general medical care and f/u.  Patient seen and examined at bedside.  No issues per nursing.  Patient has no acute complaints.  Patient has dementia and requires assist with ADLs.  OA, denies pain.  Hyperlipidemia, denies muscle aches.  Mentation at baseline, no acute distress.    Objective  Vital signs: 104 54, 97.8, 57, 16, 99%    Physical Exam  Constitutional:       General: She is not in acute distress.  Eyes:      Extraocular Movements: Extraocular movements intact.   Pulmonary:      Effort: Pulmonary effort is normal.   Musculoskeletal:      Cervical back: Neck supple.   Neurological:      Mental Status: She is alert.   Psychiatric:         Mood and Affect: Mood normal.         Behavior: Behavior is cooperative.         Assessment/Plan  Problem List Items Addressed This Visit       Dementia (Multi) - Primary     Assist with care  Monitor for worsening symptoms.  Donepezil  Benzatropine  Seroquel  Trazodone           HLD (hyperlipidemia)     Continue atorvastatin.  Monitor lipids and LFTs routinely         OA (osteoarthritis)     Pain is controlled.   Monitor for changes.          Medications, treatments, and labs reviewed  Continue medications and treatments as listed in EMR    Scribe Attestation  IKenia Scribe   attest that this documentation has been prepared under the direction and in the presence of Krissy Tillman MD    Provider Attestation - Scribe documentation  All medical record entries made by the Scribe were at my direction and personally dictated by me. I have reviewed the chart and agree that the record accurately reflects my personal performance of the history, physical exam, discussion  and plan.   Krissy Tillman MD      Electronically Signed By: Krissy Tillman MD   8/20/24  3:40 PM

## 2024-08-23 ENCOUNTER — NURSING HOME VISIT (OUTPATIENT)
Dept: POST ACUTE CARE | Facility: EXTERNAL LOCATION | Age: 79
End: 2024-08-23
Payer: MEDICARE

## 2024-08-23 DIAGNOSIS — E78.5 HYPERLIPIDEMIA, UNSPECIFIED HYPERLIPIDEMIA TYPE: ICD-10-CM

## 2024-08-23 DIAGNOSIS — G31.83 NEUROCOGNITIVE DISORDER WITH LEWY BODIES (CODE) (MULTI): Primary | ICD-10-CM

## 2024-08-23 DIAGNOSIS — F03.B0 MODERATE DEMENTIA, UNSPECIFIED DEMENTIA TYPE, UNSPECIFIED WHETHER BEHAVIORAL, PSYCHOTIC, OR MOOD DISTURBANCE OR ANXIETY (MULTI): ICD-10-CM

## 2024-08-23 PROCEDURE — 99349 HOME/RES VST EST MOD MDM 40: CPT | Performed by: NURSE PRACTITIONER

## 2024-08-23 NOTE — LETTER
Patient: Gabby Espino  : 1945    Encounter Date: 2024    PROGRESS NOTE    Subjective  Chief complaint: Gabby Espino is a 79 y.o. female who is an assisted living facility patient being seen and evaluated for behavior     HPI: Nursing reports that she remains resistant with care and she is easily angered, threatens and spits at the staff. Nursng further reports that she is often suspicious of others.   Reviewed current medications.   Pleasant and talkative to my visit. Is ambulatory throughout the unit. Talkative about living in [place].  Reports that she has a good appetite and likes the food.   Will adjust antipsychotic and monitor behaviors.    I placed call to mental daryl provider  Current weight - 156.8 pounds   HPI  Objective  Vital signs: 104/54-57-16-97.8     Physical Exam  Vitals and nursing note reviewed.   Constitutional:       General: She is not in acute distress.  Eyes:      Extraocular Movements: Extraocular movements intact.   Cardiovascular:      Rate and Rhythm: Normal rate and regular rhythm.   Pulmonary:      Effort: Pulmonary effort is normal.      Breath sounds: Normal breath sounds.      Comments: Lungs clear   Abdominal:      General: Bowel sounds are normal.      Palpations: Abdomen is soft.   Musculoskeletal:      Cervical back: Neck supple.      Right lower leg: No edema.      Left lower leg: No edema.   Neurological:      Mental Status: She is alert.   Psychiatric:         Mood and Affect: Mood normal. Affect is inappropriate.         Behavior: Behavior is cooperative.         Cognition and Memory: Cognition is impaired. Memory is impaired.      Comments: Nursing reports easily angered, resistant to care   Spitting   Verbally abusive          Assessment/Plan  Problem List Items Addressed This Visit       Dementia (Multi)     Donepezil  Benzatropine  Seroquel  Trazodone  Encourage all abilities   Re approach when resistant            HLD (hyperlipidemia)     Continue  atorvastatin.  Monitor lipids and LFTs routinely         Neurocognitive disorder with Lewy bodies (CODE) (Multi) - Primary     Has significant cognitive loss   Nursing reports behavior - easily angered. Verbally abusive and spits at staff with care.   Stressed with nursing - staff to re approach   [facility]ental [facility]aryl follows   Cooperative at this vist   Will adjust antipsychotic   Called mental daryl - awaiting return phone call.  Will order labs           Medications, treatments, and labs reviewed  Continue medications and treatments as listed in EMR    KATHIE Giron      Electronically Signed By: KATHIE Giron   8/25/24  8:51 AM

## 2024-08-24 NOTE — PROGRESS NOTES
PROGRESS NOTE    Subjective   Chief complaint: Gabby Espino is a 79 y.o. female who is an assisted living facility patient being seen and evaluated for behavior     HPI: Nursing reports that she remains resistant with care and she is easily angered, threatens and spits at the staff. Nursng further reports that she is often suspicious of others.   Reviewed current medications.   Pleasant and talkative to my visit. Is ambulatory throughout the unit. Talkative about living in [place].  Reports that she has a good appetite and likes the food.   Will adjust antipsychotic and monitor behaviors.    I placed call to mental daryl provider  Current weight - 156.8 pounds   HPI  Objective   Vital signs: 104/54-57-16-97.8     Physical Exam  Vitals and nursing note reviewed.   Constitutional:       General: She is not in acute distress.  Eyes:      Extraocular Movements: Extraocular movements intact.   Cardiovascular:      Rate and Rhythm: Normal rate and regular rhythm.   Pulmonary:      Effort: Pulmonary effort is normal.      Breath sounds: Normal breath sounds.      Comments: Lungs clear   Abdominal:      General: Bowel sounds are normal.      Palpations: Abdomen is soft.   Musculoskeletal:      Cervical back: Neck supple.      Right lower leg: No edema.      Left lower leg: No edema.   Neurological:      Mental Status: She is alert.   Psychiatric:         Mood and Affect: Mood normal. Affect is inappropriate.         Behavior: Behavior is cooperative.         Cognition and Memory: Cognition is impaired. Memory is impaired.      Comments: Nursing reports easily angered, resistant to care   Spitting   Verbally abusive          Assessment/Plan   Problem List Items Addressed This Visit       Dementia (Multi)     Donepezil  Benzatropine  Seroquel  Trazodone  Encourage all abilities   Re approach when resistant            HLD (hyperlipidemia)     Continue atorvastatin.  Monitor lipids and LFTs routinely         Neurocognitive  disorder with Lewy bodies (CODE) (Multi) - Primary     Has significant cognitive loss   Nursing reports behavior - easily angered. Verbally abusive and spits at staff with care.   Stressed with nursing - staff to re approach   Mental daryl follows   Cooperative at this vist   Will adjust antipsychotic   Called mental daryl - awaiting return phone call.  Will order labs           Medications, treatments, and labs reviewed  Continue medications and treatments as listed in EMR    Frannie Root, APRN-CNP

## 2024-08-25 NOTE — ASSESSMENT & PLAN NOTE
Donepezil  Benzatropine  Seroquel  Trazodone  Encourage all abilities   Re approach when resistant

## 2024-08-25 NOTE — ASSESSMENT & PLAN NOTE
Has significant cognitive loss   Nursing reports behavior - easily angered. Verbally abusive and spits at staff with care.   Stressed with nursing - staff to re approach   Mental daryl follows   Cooperative at this vist   Will adjust antipsychotic   Called mental daryl - awaiting return phone call.  Will order labs

## 2024-09-16 ENCOUNTER — NURSING HOME VISIT (OUTPATIENT)
Dept: POST ACUTE CARE | Facility: EXTERNAL LOCATION | Age: 79
End: 2024-09-16
Payer: MEDICARE

## 2024-09-16 DIAGNOSIS — Z78.9 SELF-CARE DEFICIT: ICD-10-CM

## 2024-09-16 DIAGNOSIS — G31.83 NEUROCOGNITIVE DISORDER WITH LEWY BODIES (CODE): Primary | ICD-10-CM

## 2024-09-16 DIAGNOSIS — F03.B0 MODERATE DEMENTIA, UNSPECIFIED DEMENTIA TYPE, UNSPECIFIED WHETHER BEHAVIORAL, PSYCHOTIC, OR MOOD DISTURBANCE OR ANXIETY: ICD-10-CM

## 2024-09-16 PROCEDURE — 99348 HOME/RES VST EST LOW MDM 30: CPT | Performed by: NURSE PRACTITIONER

## 2024-09-16 NOTE — LETTER
Patient: Gabby Espino  : 1945    Encounter Date: 2024    PROGRESS NOTE    Subjective  Chief complaint: Gabby Espino is a 79 y.o. female who is an assisted living facility patient being seen and evaluated for behavior     HPI: Pleasant and talkative. Cooperative. Is sitting out in the activity area on the couch. 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    Nursing and occupational therapist reports that she has been more cooperative with the staff with care. Remains with exit seeking behavior.  Has a consistent appetite. Denies any pain or discomfort. Reviewed current medications     HPI  Objective  Vital signs: 104/54-57-16-97.8     Physical Exam  Vitals and nursing note reviewed.   Constitutional:       General: She is not in acute distress.  Eyes:      Extraocular Movements: Extraocular movements intact.   Cardiovascular:      Rate and Rhythm: Normal rate and regular rhythm.   Pulmonary:      Effort: Pulmonary effort is normal.      Breath sounds: Normal breath sounds.      Comments: Lungs clear   Abdominal:      General: Bowel sounds are normal.      Palpations: Abdomen is soft.   Musculoskeletal:      Cervical back: Neck supple.      Right lower leg: No edema.      Left lower leg: No edema.   Neurological:      Mental Status: She is alert.   Psychiatric:         Mood and Affect: Mood normal. Affect is inappropriate.         Behavior: Behavior is cooperative.         Cognition and Memory: Cognition is impaired. Memory is impaired.         Assessment/Plan  Problem List Items Addressed This Visit       Dementia (Multi)     Donepezil  Benzatropine  Seroquel  Trazodone  Encourage all abilities   Re approach when resistant            Neurocognitive disorder with Lewy bodies (CODE) (Multi) - Primary     Has significant cognitive loss   Nursing and OT reports behavior - has improved following medication adjustment, seroquel increased   Mental daryl follows   Cooperative at this visit,  talkative about teaching children   Remains exit seeking                 Self-care deficit     Encourage all abilities   Staff to cue and direct as required   Therapy - working shower  activities           Medications, treatments, and labs reviewed  Continue medications and treatments as listed in EMR    KATHIE Giron      Electronically Signed By: KATHIE Giron   9/17/24  2:56 PM

## 2024-09-17 PROBLEM — Z78.9 SELF-CARE DEFICIT: Status: ACTIVE | Noted: 2024-09-17

## 2024-09-17 NOTE — PROGRESS NOTES
PROGRESS NOTE    Subjective   Chief complaint: Gabby Espino is a 79 y.o. female who is an assisted living facility patient being seen and evaluated for behavior     HPI: Pleasant and talkative. Cooperative. Is sitting out in the activity area on the couch. 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    Nursing and occupational therapist reports that she has been more cooperative with the staff with care. Remains with exit seeking behavior.  Has a consistent appetite. Denies any pain or discomfort. Reviewed current medications     HPI  Objective   Vital signs: 104/54-57-16-97.8     Physical Exam  Vitals and nursing note reviewed.   Constitutional:       General: She is not in acute distress.  Eyes:      Extraocular Movements: Extraocular movements intact.   Cardiovascular:      Rate and Rhythm: Normal rate and regular rhythm.   Pulmonary:      Effort: Pulmonary effort is normal.      Breath sounds: Normal breath sounds.      Comments: Lungs clear   Abdominal:      General: Bowel sounds are normal.      Palpations: Abdomen is soft.   Musculoskeletal:      Cervical back: Neck supple.      Right lower leg: No edema.      Left lower leg: No edema.   Neurological:      Mental Status: She is alert.   Psychiatric:         Mood and Affect: Mood normal. Affect is inappropriate.         Behavior: Behavior is cooperative.         Cognition and Memory: Cognition is impaired. Memory is impaired.         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

## 2024-09-17 NOTE — ASSESSMENT & PLAN NOTE
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

## 2024-09-17 NOTE — ASSESSMENT & PLAN NOTE
Encourage all abilities   Staff to cue and direct as required   Therapy - working shower  activities

## 2024-09-26 ENCOUNTER — NURSING HOME VISIT (OUTPATIENT)
Dept: POST ACUTE CARE | Facility: EXTERNAL LOCATION | Age: 79
End: 2024-09-26
Payer: MEDICARE

## 2024-09-26 DIAGNOSIS — E78.5 HYPERLIPIDEMIA, UNSPECIFIED HYPERLIPIDEMIA TYPE: ICD-10-CM

## 2024-09-26 DIAGNOSIS — M19.90 OSTEOARTHRITIS, UNSPECIFIED OSTEOARTHRITIS TYPE, UNSPECIFIED SITE: ICD-10-CM

## 2024-09-26 DIAGNOSIS — L84 CALLUS: ICD-10-CM

## 2024-09-26 DIAGNOSIS — F03.B0 MODERATE DEMENTIA, UNSPECIFIED DEMENTIA TYPE, UNSPECIFIED WHETHER BEHAVIORAL, PSYCHOTIC, OR MOOD DISTURBANCE OR ANXIETY: Primary | ICD-10-CM

## 2024-09-26 PROCEDURE — 99348 HOME/RES VST EST LOW MDM 30: CPT | Performed by: INTERNAL MEDICINE

## 2024-09-26 NOTE — LETTER
Patient: Gabby Espino  : 1945    Encounter Date: 2024    PROGRESS NOTE    Subjective  Chief complaint: Gabby Espino is a 79 y.o. female who is an assisted living facility patient being seen and evaluated for monthly general medical care and follow-up    HPI:  HPI  Patient presents for general medical care and f/u.  Patient seen and examined at bedside.  No issues per nursing.  Patient reports of right foot pain and redness.  On examination revealed a dried callus from shoes.  Patient has dementia and requires assist with ADLs.  HLD denies muscle aches.  OA denies pain.  Mentation at baseline, no acute distress.    Objective  Vital signs: 104/54, 97.8, 57, 16, 99%    Physical Exam  Constitutional:       General: She is not in acute distress.  Eyes:      Extraocular Movements: Extraocular movements intact.   Pulmonary:      Effort: Pulmonary effort is normal.   Musculoskeletal:      Cervical back: Neck supple.   Skin:     Comments: Callus right foot   Neurological:      Mental Status: She is alert.   Psychiatric:         Mood and Affect: Mood normal.         Behavior: Behavior is cooperative.         Assessment/Plan  Problem List Items Addressed This Visit       Dementia (Multi) - Primary     Assist with care  Monitor for worsening symptoms.  Donepezil  Benzatropine  Seroquel  Trazodone           HLD (hyperlipidemia)     Continue atorvastatin.  Monitor lipids and LFTs routinely         OA (osteoarthritis)     Pain is controlled.   Monitor for changes.         Callus     Right foot from shoes.  Monitor          Medications, treatments, and labs reviewed  Continue medications and treatments as listed in EMR    Scribe Attestation  I, Darby Almaraz   attest that this documentation has been prepared under the direction and in the presence of Krissy Tillman MD    Provider Attestation - Scribe documentation  All medical record entries made by the Scribe were at my direction and personally dictated  by me. I have reviewed the chart and agree that the record accurately reflects my personal performance of the history, physical exam, discussion and plan.   Krissy Tillman MD      Electronically Signed By: Krissy Tillman MD   9/27/24 12:32 PM

## 2024-09-26 NOTE — PROGRESS NOTES
PROGRESS NOTE    Subjective   Chief complaint: Gabby Espino is a 79 y.o. female who is an assisted living facility patient being seen and evaluated for monthly general medical care and follow-up    HPI:  HPI  Patient presents for general medical care and f/u.  Patient seen and examined at bedside.  No issues per nursing.  Patient reports of right foot pain and redness.  On examination revealed a dried callus from shoes.  Patient has dementia and requires assist with ADLs.  HLD denies muscle aches.  OA denies pain.  Mentation at baseline, no acute distress.    Objective   Vital signs: 104/54, 97.8, 57, 16, 99%    Physical Exam  Constitutional:       General: She is not in acute distress.  Eyes:      Extraocular Movements: Extraocular movements intact.   Pulmonary:      Effort: Pulmonary effort is normal.   Musculoskeletal:      Cervical back: Neck supple.   Skin:     Comments: Callus right foot   Neurological:      Mental Status: She is alert.   Psychiatric:         Mood and Affect: Mood normal.         Behavior: Behavior is cooperative.         Assessment/Plan   Problem List Items Addressed This Visit       Dementia (Multi) - Primary     Assist with care  Monitor for worsening symptoms.  Donepezil  Benzatropine  Seroquel  Trazodone           HLD (hyperlipidemia)     Continue atorvastatin.  Monitor lipids and LFTs routinely         OA (osteoarthritis)     Pain is controlled.   Monitor for changes.         Callus     Right foot from shoes.  Monitor          Medications, treatments, and labs reviewed  Continue medications and treatments as listed in EMR    Scribe Attestation  IKenia Scribe   attest that this documentation has been prepared under the direction and in the presence of Krissy Tillman MD    Provider Attestation - Scribe documentation  All medical record entries made by the Scribe were at my direction and personally dictated by me. I have reviewed the chart and agree that the record  accurately reflects my personal performance of the history, physical exam, discussion and plan.   Krissy Tillman MD

## 2024-10-10 ENCOUNTER — NURSING HOME VISIT (OUTPATIENT)
Dept: POST ACUTE CARE | Facility: EXTERNAL LOCATION | Age: 79
End: 2024-10-10
Payer: MEDICARE

## 2024-10-10 DIAGNOSIS — M19.90 OSTEOARTHRITIS, UNSPECIFIED OSTEOARTHRITIS TYPE, UNSPECIFIED SITE: ICD-10-CM

## 2024-10-10 DIAGNOSIS — E78.5 HYPERLIPIDEMIA, UNSPECIFIED HYPERLIPIDEMIA TYPE: ICD-10-CM

## 2024-10-10 DIAGNOSIS — F03.B0 MODERATE DEMENTIA, UNSPECIFIED DEMENTIA TYPE, UNSPECIFIED WHETHER BEHAVIORAL, PSYCHOTIC, OR MOOD DISTURBANCE OR ANXIETY: Primary | ICD-10-CM

## 2024-10-10 PROCEDURE — 99348 HOME/RES VST EST LOW MDM 30: CPT | Performed by: INTERNAL MEDICINE

## 2024-10-10 NOTE — LETTER
Patient: Gabby Espino  : 1945    Encounter Date: 10/10/2024    PROGRESS NOTE    Subjective  Chief complaint: Gabby Espino is a 79 y.o. female who is an assisted living facility patient being seen and evaluated for monthly general medical care and follow-up    HPI:  HPI  Patient presents for general medical care and f/u.  Patient seen and examined at bedside.  No issues per nursing.  Patient has no acute complaints.  Patient has dementia and requires assist with ADLs.  HLD denies muscle aches.,  Osteoarthritis, denies pain.  Mentation at baseline, no acute distress.    Objective  Vital signs: 128/86, 98.2, 93, 16, 99%    Physical Exam  Constitutional:       General: She is not in acute distress.  Eyes:      Extraocular Movements: Extraocular movements intact.   Pulmonary:      Effort: Pulmonary effort is normal.   Musculoskeletal:      Cervical back: Neck supple.   Skin:     Comments: Callus right foot   Neurological:      Mental Status: She is alert.   Psychiatric:         Mood and Affect: Mood normal.         Behavior: Behavior is cooperative.         Assessment/Plan  Problem List Items Addressed This Visit       Dementia - Primary     Assist with care  Monitor for worsening symptoms.  Donepezil  Benzatropine  Seroquel  Trazodone           HLD (hyperlipidemia)     Continue atorvastatin.  Monitor lipids and LFTs routinely         OA (osteoarthritis)     Pain is controlled.   Monitor for changes.          Medications, treatments, and labs reviewed  Continue medications and treatments as listed in EMR    Scribe Attestation  Kenia MENARD Scribe   attest that this documentation has been prepared under the direction and in the presence of Krissy Tillman MD    Provider Attestation - Scribe documentation  All medical record entries made by the Scribe were at my direction and personally dictated by me. I have reviewed the chart and agree that the record accurately reflects my personal performance of the  history, physical exam, discussion and plan.   Krissy Tillman MD      Electronically Signed By: Krissy Tillman MD   10/11/24  1:07 PM

## 2024-10-10 NOTE — PROGRESS NOTES
PROGRESS NOTE    Subjective   Chief complaint: Gabby Espino is a 79 y.o. female who is an assisted living facility patient being seen and evaluated for monthly general medical care and follow-up    HPI:  HPI  Patient presents for general medical care and f/u.  Patient seen and examined at bedside.  No issues per nursing.  Patient has no acute complaints.  Patient has dementia and requires assist with ADLs.  HLD denies muscle aches.,  Osteoarthritis, denies pain.  Mentation at baseline, no acute distress.    Objective   Vital signs: 128/86, 98.2, 93, 16, 99%    Physical Exam  Constitutional:       General: She is not in acute distress.  Eyes:      Extraocular Movements: Extraocular movements intact.   Pulmonary:      Effort: Pulmonary effort is normal.   Musculoskeletal:      Cervical back: Neck supple.   Skin:     Comments: Callus right foot   Neurological:      Mental Status: She is alert.   Psychiatric:         Mood and Affect: Mood normal.         Behavior: Behavior is cooperative.         Assessment/Plan   Problem List Items Addressed This Visit       Dementia - Primary     Assist with care  Monitor for worsening symptoms.  Donepezil  Benzatropine  Seroquel  Trazodone           HLD (hyperlipidemia)     Continue atorvastatin.  Monitor lipids and LFTs routinely         OA (osteoarthritis)     Pain is controlled.   Monitor for changes.          Medications, treatments, and labs reviewed  Continue medications and treatments as listed in EMR    Scribe Attestation  I, Darby Almaraz   attest that this documentation has been prepared under the direction and in the presence of Krissy Tillman MD    Provider Attestation - Scribe documentation  All medical record entries made by the Scribe were at my direction and personally dictated by me. I have reviewed the chart and agree that the record accurately reflects my personal performance of the history, physical exam, discussion and plan.   Krissy Tillman MD

## 2024-11-21 ENCOUNTER — NURSING HOME VISIT (OUTPATIENT)
Dept: POST ACUTE CARE | Facility: EXTERNAL LOCATION | Age: 79
End: 2024-11-21
Payer: MEDICARE

## 2024-11-21 DIAGNOSIS — M19.90 OSTEOARTHRITIS, UNSPECIFIED OSTEOARTHRITIS TYPE, UNSPECIFIED SITE: ICD-10-CM

## 2024-11-21 DIAGNOSIS — E78.5 HYPERLIPIDEMIA, UNSPECIFIED HYPERLIPIDEMIA TYPE: ICD-10-CM

## 2024-11-21 DIAGNOSIS — F03.B0 MODERATE DEMENTIA, UNSPECIFIED DEMENTIA TYPE, UNSPECIFIED WHETHER BEHAVIORAL, PSYCHOTIC, OR MOOD DISTURBANCE OR ANXIETY: Primary | ICD-10-CM

## 2024-11-21 NOTE — LETTER
Patient: Gabby Espino  : 1945    Encounter Date: 2024    PROGRESS NOTE    Subjective  Chief complaint: Gabby Espino is a 79 y.o. female who is an assisted living facility patient being seen and evaluated for monthly general medical care and follow-up    HPI:  HPI  Patient presents for general medical care and f/u.  Patient seen and examined at bedside.  No issues per nursing.  Patient has no acute complaints.  HLD denies muscle aches.  Patient has dementia and requires assist with ADLs.  OA denies pain.  Mentation at baseline, no acute distress.    Objective  Vital signs: 128/86, 98.2, 93, 16, 99%    Physical Exam  Constitutional:       General: She is not in acute distress.  Eyes:      Extraocular Movements: Extraocular movements intact.   Pulmonary:      Effort: Pulmonary effort is normal.   Musculoskeletal:      Cervical back: Neck supple.   Neurological:      Mental Status: She is alert.      Comments: A&O X2-3   Psychiatric:         Mood and Affect: Mood normal.         Behavior: Behavior is cooperative.         Assessment/Plan  Problem List Items Addressed This Visit       Dementia - Primary     Assist with care  Monitor for worsening symptoms.  Donepezil  Benzatropine  Seroquel  Trazodone           HLD (hyperlipidemia)     Continue atorvastatin.  Monitor lipids and LFTs routinely         OA (osteoarthritis)     Pain is controlled.   Monitor for changes.          Medications, treatments, and labs reviewed  Continue medications and treatments as listed in EMR    Scribe Attestation  IKenia Scribe   attest that this documentation has been prepared under the direction and in the presence of Krissy Tillman MD    Provider Attestation - Scribe documentation  All medical record entries made by the Scribe were at my direction and personally dictated by me. I have reviewed the chart and agree that the record accurately reflects my personal performance of the history, physical exam,  discussion and plan.   Krissy Tillman MD      Electronically Signed By: Krissy Tillman MD   11/22/24  2:57 PM

## 2024-11-21 NOTE — PROGRESS NOTES
PROGRESS NOTE    Subjective   Chief complaint: Gabby Espino is a 79 y.o. female who is an assisted living facility patient being seen and evaluated for monthly general medical care and follow-up    HPI:  HPI  Patient presents for general medical care and f/u.  Patient seen and examined at bedside.  No issues per nursing.  Patient has no acute complaints.  HLD denies muscle aches.  Patient has dementia and requires assist with ADLs.  OA denies pain.  Mentation at baseline, no acute distress.    Objective   Vital signs: 128/86, 98.2, 93, 16, 99%    Physical Exam  Constitutional:       General: She is not in acute distress.  Eyes:      Extraocular Movements: Extraocular movements intact.   Pulmonary:      Effort: Pulmonary effort is normal.   Musculoskeletal:      Cervical back: Neck supple.   Neurological:      Mental Status: She is alert.      Comments: A&O X2-3   Psychiatric:         Mood and Affect: Mood normal.         Behavior: Behavior is cooperative.         Assessment/Plan   Problem List Items Addressed This Visit       Dementia - Primary     Assist with care  Monitor for worsening symptoms.  Donepezil  Benzatropine  Seroquel  Trazodone           HLD (hyperlipidemia)     Continue atorvastatin.  Monitor lipids and LFTs routinely         OA (osteoarthritis)     Pain is controlled.   Monitor for changes.          Medications, treatments, and labs reviewed  Continue medications and treatments as listed in EMR    Scribe Attestation  I, Darby Almaraz   attest that this documentation has been prepared under the direction and in the presence of Krissy Tillman MD    Provider Attestation - Scribe documentation  All medical record entries made by the Scribe were at my direction and personally dictated by me. I have reviewed the chart and agree that the record accurately reflects my personal performance of the history, physical exam, discussion and plan.   Krissy Tillman MD

## 2024-12-24 ENCOUNTER — NURSING HOME VISIT (OUTPATIENT)
Dept: POST ACUTE CARE | Facility: EXTERNAL LOCATION | Age: 79
End: 2024-12-24
Payer: MEDICARE

## 2024-12-24 DIAGNOSIS — M19.90 OSTEOARTHRITIS, UNSPECIFIED OSTEOARTHRITIS TYPE, UNSPECIFIED SITE: ICD-10-CM

## 2024-12-24 DIAGNOSIS — E78.5 HYPERLIPIDEMIA, UNSPECIFIED HYPERLIPIDEMIA TYPE: ICD-10-CM

## 2024-12-24 DIAGNOSIS — F03.B0 MODERATE DEMENTIA, UNSPECIFIED DEMENTIA TYPE, UNSPECIFIED WHETHER BEHAVIORAL, PSYCHOTIC, OR MOOD DISTURBANCE OR ANXIETY: Primary | ICD-10-CM

## 2024-12-24 PROCEDURE — 99349 HOME/RES VST EST MOD MDM 40: CPT | Performed by: INTERNAL MEDICINE

## 2024-12-24 NOTE — LETTER
Patient: Gabby Espino  : 1945    Encounter Date: 2024    PROGRESS NOTE    Subjective  Chief complaint: Gabby Espino is a 79 y.o. female who is an assisted living facility patient being seen and evaluated for monthly general medical care and follow-up    HPI:  HPI  Patient presents for general medical care and f/u.  Patient seen and examined at bedside.  No issues per nursing.  Patient has no acute complaints.  Patient has dementia and requires assist with ADLs.  OA denies increased pain.  HLD denies muscle aches.  Mentation at baseline, no acute distress.    Objective  Vital signs: 118/88, 98.4, 63, 18, 98%    Physical Exam  Constitutional:       General: She is not in acute distress.  Eyes:      Extraocular Movements: Extraocular movements intact.   Pulmonary:      Effort: Pulmonary effort is normal.   Musculoskeletal:      Cervical back: Neck supple.   Neurological:      Mental Status: She is alert.      Comments: A&O X2-3   Psychiatric:         Mood and Affect: Mood normal.         Behavior: Behavior is cooperative.         Assessment/Plan  Problem List Items Addressed This Visit       Dementia - Primary     Assist with care  Monitor for worsening symptoms.  Donepezil  Benzatropine  Seroquel  Trazodone           HLD (hyperlipidemia)     Continue atorvastatin.  Monitor lipids and LFTs routinely         OA (osteoarthritis)     Pain is controlled.   Monitor for changes.          Medications, treatments, and labs reviewed  Continue medications and treatments as listed in EMR    Scribe Attestation  Kenia MENARD Scribe   attest that this documentation has been prepared under the direction and in the presence of Krissy Tillman MD    Provider Attestation - Scribe documentation  All medical record entries made by the Scribe were at my direction and personally dictated by me. I have reviewed the chart and agree that the record accurately reflects my personal performance of the history, physical  exam, discussion and plan.   Krissy Tillman MD      Electronically Signed By: Krissy Tillman MD   12/25/24 12:46 PM

## 2024-12-24 NOTE — PROGRESS NOTES
PROGRESS NOTE    Subjective   Chief complaint: Gabby Espino is a 79 y.o. female who is an assisted living facility patient being seen and evaluated for monthly general medical care and follow-up    HPI:  HPI  Patient presents for general medical care and f/u.  Patient seen and examined at bedside.  No issues per nursing.  Patient has no acute complaints.  Patient has dementia and requires assist with ADLs.  OA denies increased pain.  HLD denies muscle aches.  Mentation at baseline, no acute distress.    Objective   Vital signs: 118/88, 98.4, 63, 18, 98%    Physical Exam  Constitutional:       General: She is not in acute distress.  Eyes:      Extraocular Movements: Extraocular movements intact.   Pulmonary:      Effort: Pulmonary effort is normal.   Musculoskeletal:      Cervical back: Neck supple.   Neurological:      Mental Status: She is alert.      Comments: A&O X2-3   Psychiatric:         Mood and Affect: Mood normal.         Behavior: Behavior is cooperative.         Assessment/Plan   Problem List Items Addressed This Visit       Dementia - Primary     Assist with care  Monitor for worsening symptoms.  Donepezil  Benzatropine  Seroquel  Trazodone           HLD (hyperlipidemia)     Continue atorvastatin.  Monitor lipids and LFTs routinely         OA (osteoarthritis)     Pain is controlled.   Monitor for changes.          Medications, treatments, and labs reviewed  Continue medications and treatments as listed in EMR    Scribe Attestation  I, Darby Almaraz   attest that this documentation has been prepared under the direction and in the presence of Krissy Tillman MD    Provider Attestation - Scribe documentation  All medical record entries made by the Scribe were at my direction and personally dictated by me. I have reviewed the chart and agree that the record accurately reflects my personal performance of the history, physical exam, discussion and plan.   Krissy Tillman MD